# Patient Record
Sex: MALE | NOT HISPANIC OR LATINO | Employment: FULL TIME | URBAN - METROPOLITAN AREA
[De-identification: names, ages, dates, MRNs, and addresses within clinical notes are randomized per-mention and may not be internally consistent; named-entity substitution may affect disease eponyms.]

---
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
## 2020-03-10 ENCOUNTER — HEALTH MAINTENANCE LETTER (OUTPATIENT)
Age: 20
End: 2020-03-10

## 2020-12-14 ENCOUNTER — HOSPITAL ENCOUNTER (EMERGENCY)
Facility: CLINIC | Age: 20
Discharge: HOME | End: 2020-12-14
Attending: EMERGENCY MEDICINE | Admitting: EMERGENCY MEDICINE
Payer: COMMERCIAL

## 2020-12-14 VITALS
DIASTOLIC BLOOD PRESSURE: 66 MMHG | SYSTOLIC BLOOD PRESSURE: 119 MMHG | TEMPERATURE: 98.3 F | OXYGEN SATURATION: 98 % | HEART RATE: 81 BPM | RESPIRATION RATE: 10 BRPM

## 2020-12-14 DIAGNOSIS — T40.1X1A ACCIDENTAL OVERDOSE OF HEROIN, INITIAL ENCOUNTER (H): ICD-10-CM

## 2020-12-14 PROCEDURE — 96361 HYDRATE IV INFUSION ADD-ON: CPT

## 2020-12-14 PROCEDURE — 250N000011 HC RX IP 250 OP 636: Performed by: EMERGENCY MEDICINE

## 2020-12-14 PROCEDURE — 96374 THER/PROPH/DIAG INJ IV PUSH: CPT

## 2020-12-14 PROCEDURE — 99285 EMERGENCY DEPT VISIT HI MDM: CPT | Mod: 25

## 2020-12-14 PROCEDURE — 250N000013 HC RX MED GY IP 250 OP 250 PS 637: Performed by: EMERGENCY MEDICINE

## 2020-12-14 PROCEDURE — 258N000003 HC RX IP 258 OP 636: Performed by: EMERGENCY MEDICINE

## 2020-12-14 RX ORDER — ONDANSETRON 2 MG/ML
4 INJECTION INTRAMUSCULAR; INTRAVENOUS EVERY 30 MIN PRN
Status: DISCONTINUED | OUTPATIENT
Start: 2020-12-14 | End: 2020-12-14 | Stop reason: HOSPADM

## 2020-12-14 RX ORDER — ACETAMINOPHEN 325 MG/1
975 TABLET ORAL ONCE
Status: COMPLETED | OUTPATIENT
Start: 2020-12-14 | End: 2020-12-14

## 2020-12-14 RX ADMIN — ONDANSETRON 4 MG: 2 INJECTION INTRAMUSCULAR; INTRAVENOUS at 06:00

## 2020-12-14 RX ADMIN — ACETAMINOPHEN 975 MG: 325 TABLET, FILM COATED ORAL at 06:02

## 2020-12-14 RX ADMIN — SODIUM CHLORIDE 1000 ML: 9 INJECTION, SOLUTION INTRAVENOUS at 06:02

## 2020-12-14 ASSESSMENT — ENCOUNTER SYMPTOMS
NAUSEA: 1
HEADACHES: 1
CHILLS: 1
VOMITING: 0
SHORTNESS OF BREATH: 0

## 2020-12-14 NOTE — ED PROVIDER NOTES
History     Chief Complaint:  Drug Overdose      HPI   Mati Costa is a 20 year old male who presents via EMS for evaluation after a drug overdose.  The patient reports snorting a total of a half gram of heroin over several hours between 2100 and 0300.  His girlfriend gave him 3 doses of nasal narcan, 4 mg each.  By the time EMS arrived, he was alert and oriented.  He had been clean from heroin for 9 months before relapsing.  He complains of a headache, nausea, and feeling cold.  He denies any shortness of breath.      Allergies:  No known drug allergies.     Medications:    Zyprexa    Past Medical History:    Psychotic disorder   Substance abuse   Anxiety     Past Surgical History:    Pressure equalizing tubes     Family History:    History reviewed. No pertinent family history.     Social History:  Presents to the ED alone.  Drug use: Heroin.   PCP: Viry Jiang      Review of Systems   Constitutional: Positive for chills.   Respiratory: Negative for shortness of breath.    Gastrointestinal: Positive for nausea. Negative for vomiting.   Neurological: Positive for headaches.   All other systems reviewed and are negative.    Physical Exam   First Vitals:  BP: 122/82  Pulse: 70  Temp: 98.3  F (36.8  C)  Resp: 23  SpO2: 100 %      Physical Exam  General: Patient is alert, awake and interactive when I enter the room  Head: The scalp, face, and head appear normal  Eyes: Conjunctivae are normal  ENT: The nose is normal, Pinnae are normal, External acoustic canals are normal  Neck: Trachea midline  CV: Pulses are normal.   Resp: No respiratory distress   Musc: Normal muscular tone, moving all extremities.  Skin: No rash or lesions noted  Neuro: Speech is normal and fluent. Face is symmetric.   Psych: Flat affect, denies suicidal or homicidal ideations      Emergency Department Course     Interventions:   (0600) Zofran, 4 mg, IV injection   (0602) Normal Saline, 1 liter, IV bolus   (0602) Tylenol, 975 mg, PO      Emergency Department Course:  The patient arrived in the emergency department via [facility].   Nursing notes and vitals reviewed.  I performed an exam of the patient as documented above.     A peripheral IV was established.  Blood was drawn and sent for laboratory testing, results as above. The patient was placed on continuous cardiac monitoring and pulse oximetry.      I signed out the patient to the oncoming physician, Dr. Denny Dumont, pending full period of monitoring following Narcan administration.     Impression & Plan      Medical Decision Making:  Patient is a 20-year-old gentleman with past medical history of polysubstance abuse who presents emergency department today after an accidental heroin overdose.  Patient reports he snorted half a gram of heroin over the evening.  At which point patient allegedly had minimal respirations and was given multiple doses of intranasal Narcan by his girlfriend at the scene.  By the time EMS arrived patient was awake and alert.  There is no evidence of significant respiratory compromise.  On my initial evaluation he is mildly somnolent but has no respiratory depression.  He denies any coingestions.  Further denies any suicidal homicidal ideations.  Patient will require period of observation prior to his discharge to ensure he does not require any repeat doses of Narcan.    Diagnosis:    ICD-10-CM    1. Accidental overdose of heroin, initial encounter (H)  T40.1X1A        Disposition:  Signed out to oncoming provider at shift change.      I, Mari Han, am serving as a scribe on 12/14/2020 at 5:58 AM to personally document services performed by Xavier Leo MD based on my observations and the provider's statements to me.     Mari Han  12/14/2020   [facility] EMERGENCY DEPT       Xavier Leo MD  12/14/20 0654

## 2020-12-14 NOTE — ED NOTES
Bed: ED17  Expected date:   Expected time:   Means of arrival:   Comments:  Leonarda 1 20m heroin overdose A7O after nasal narcan eta 15

## 2020-12-14 NOTE — ED TRIAGE NOTES
Sober x9 months.  1/2 gram heroin between 2100, 0000, and 0300.  Overdosed on the 3rd dose and girlfriend gave him 12mg narcan.  A/O x4 on EMS arrival.  18G right AC IV inserted.

## 2020-12-14 NOTE — ED AVS SNAPSHOT
[facility] Emergency Dept  [address] [address]  Phone: [phone]  Fax: [phone]                                    Mati Costa   MRN: [id number]    Department: [facility] Emergency Dept   Date of Visit: 12/14/2020           After Visit Summary Signature Page    I have received my discharge instructions, and my questions have been answered. I have discussed any challenges I see with this plan with the nurse or doctor.    ..........................................................................................................................................  Patient/Patient Representative Signature      ..........................................................................................................................................  Patient Representative Print Name and Relationship to Patient    ..................................................               ................................................  Date                                   Time    ..........................................................................................................................................  Reviewed by Signature/Title    ...................................................              ..............................................  Date                                               Time          22EPIC Rev 08/18

## 2020-12-14 NOTE — ED PROVIDER NOTES
Sign Out Note    Pt accepted in sign out from: Dr. Yony Boateng pt presented to the ED for: Heroin overdose.    Plan at time of sign out: Allow the patient to await here for a total of 4 hours from last Narcan use and discharge if no repeat Narcan needed given patient's desire to be discharged.    Care of patient during my shift: No issues.  Patient strongly desired to leave after being here for 3 hours and 35 minutes.  This, along with the transport time to get here after the Narcan used as total 4 hours.    I had a face-to-face with the patient.  He has no medical concerns he wants addressed.  He feels safe going home.  I encourage close follow-up with the primary care physician and encouraged sobriety as well.  He agreed. He was given a prescription for Narcan.  His girlfriend is here to pick him up.    Plan for patient at this time: Discharged     Jaswinder Dumont, DO  12/14/20 0909

## 2020-12-27 ENCOUNTER — HEALTH MAINTENANCE LETTER (OUTPATIENT)
Age: 20
End: 2020-12-27

## 2021-04-02 ENCOUNTER — HOSPITAL ENCOUNTER (INPATIENT)
Facility: CLINIC | Age: 21
LOS: 2 days | Discharge: HOME | End: 2021-04-05
Attending: EMERGENCY MEDICINE | Admitting: PSYCHIATRY & NEUROLOGY
Payer: COMMERCIAL

## 2021-04-02 DIAGNOSIS — F10.10 ALCOHOL ABUSE: ICD-10-CM

## 2021-04-02 DIAGNOSIS — Z11.52 ENCOUNTER FOR SCREENING LABORATORY TESTING FOR SEVERE ACUTE RESPIRATORY SYNDROME CORONAVIRUS 2 (SARS-COV-2): ICD-10-CM

## 2021-04-02 DIAGNOSIS — F11.10 OPIOID ABUSE (H): ICD-10-CM

## 2021-04-02 LAB
ALBUMIN SERPL-MCNC: 3.7 G/DL (ref 3.4–5)
ALBUMIN UR-MCNC: NEGATIVE MG/DL
ALCOHOL BREATH TEST: 0.03 (ref 0–0.01)
ALP SERPL-CCNC: 71 U/L (ref 40–150)
ALT SERPL W P-5'-P-CCNC: 22 U/L (ref 0–70)
AMPHETAMINES UR QL SCN: NEGATIVE
ANION GAP SERPL CALCULATED.3IONS-SCNC: 8 MMOL/L (ref 3–14)
APPEARANCE UR: CLEAR
AST SERPL W P-5'-P-CCNC: 10 U/L (ref 0–45)
BACTERIA #/AREA URNS HPF: ABNORMAL /HPF
BARBITURATES UR QL: NEGATIVE
BASOPHILS # BLD AUTO: 0 10E9/L (ref 0–0.2)
BASOPHILS NFR BLD AUTO: 0.3 %
BENZODIAZ UR QL: NEGATIVE
BILIRUB SERPL-MCNC: 0.4 MG/DL (ref 0.2–1.3)
BILIRUB UR QL STRIP: NEGATIVE
BUN SERPL-MCNC: 15 MG/DL (ref 7–30)
CALCIUM SERPL-MCNC: 8.8 MG/DL (ref 8.5–10.1)
CANNABINOIDS UR QL SCN: NEGATIVE
CHLORIDE SERPL-SCNC: 104 MMOL/L (ref 94–109)
CO2 SERPL-SCNC: 27 MMOL/L (ref 20–32)
COCAINE UR QL: NEGATIVE
COLOR UR AUTO: ABNORMAL
CREAT SERPL-MCNC: 0.79 MG/DL (ref 0.66–1.25)
DIFFERENTIAL METHOD BLD: ABNORMAL
EOSINOPHIL # BLD AUTO: 0.1 10E9/L (ref 0–0.7)
EOSINOPHIL NFR BLD AUTO: 1 %
ERYTHROCYTE [DISTWIDTH] IN BLOOD BY AUTOMATED COUNT: 11.8 % (ref 10–15)
ETHANOL SERPL-MCNC: 0.04 G/DL
ETHANOL UR QL SCN: NEGATIVE
GFR SERPL CREATININE-BSD FRML MDRD: >90 ML/MIN/{1.73_M2}
GLUCOSE SERPL-MCNC: 116 MG/DL (ref 70–99)
GLUCOSE UR STRIP-MCNC: NEGATIVE MG/DL
HCT VFR BLD AUTO: 34.9 % (ref 40–53)
HGB BLD-MCNC: 12.2 G/DL (ref 13.3–17.7)
HGB UR QL STRIP: NEGATIVE
IMM GRANULOCYTES # BLD: 0 10E9/L (ref 0–0.4)
IMM GRANULOCYTES NFR BLD: 0.2 %
KETONES UR STRIP-MCNC: NEGATIVE MG/DL
LABORATORY COMMENT REPORT: NORMAL
LEUKOCYTE ESTERASE UR QL STRIP: NEGATIVE
LYMPHOCYTES # BLD AUTO: 2 10E9/L (ref 0.8–5.3)
LYMPHOCYTES NFR BLD AUTO: 32.4 %
MCH RBC QN AUTO: 29.6 PG (ref 26.5–33)
MCHC RBC AUTO-ENTMCNC: 35 G/DL (ref 31.5–36.5)
MCV RBC AUTO: 85 FL (ref 78–100)
MONOCYTES # BLD AUTO: 0.6 10E9/L (ref 0–1.3)
MONOCYTES NFR BLD AUTO: 9.3 %
NEUTROPHILS # BLD AUTO: 3.6 10E9/L (ref 1.6–8.3)
NEUTROPHILS NFR BLD AUTO: 56.8 %
NITRATE UR QL: NEGATIVE
NRBC # BLD AUTO: 0 10*3/UL
NRBC BLD AUTO-RTO: 0 /100
OPIATES UR QL SCN: NEGATIVE
PH UR STRIP: 5.5 PH (ref 5–7)
PLATELET # BLD AUTO: 203 10E9/L (ref 150–450)
POTASSIUM SERPL-SCNC: 3.8 MMOL/L (ref 3.4–5.3)
PROT SERPL-MCNC: 6.6 G/DL (ref 6.8–8.8)
RBC # BLD AUTO: 4.12 10E12/L (ref 4.4–5.9)
RBC #/AREA URNS AUTO: <1 /HPF (ref 0–2)
SARS-COV-2 RNA RESP QL NAA+PROBE: NEGATIVE
SODIUM SERPL-SCNC: 139 MMOL/L (ref 133–144)
SOURCE: ABNORMAL
SP GR UR STRIP: 1 (ref 1–1.03)
SPECIMEN SOURCE: NORMAL
SQUAMOUS #/AREA URNS AUTO: 0 /HPF (ref 0–1)
UROBILINOGEN UR STRIP-MCNC: NORMAL MG/DL (ref 0–2)
WBC # BLD AUTO: 6.3 10E9/L (ref 4–11)
WBC #/AREA URNS AUTO: 0 /HPF (ref 0–5)

## 2021-04-02 PROCEDURE — 81001 URINALYSIS AUTO W/SCOPE: CPT | Performed by: EMERGENCY MEDICINE

## 2021-04-02 PROCEDURE — 80307 DRUG TEST PRSMV CHEM ANLYZR: CPT | Performed by: EMERGENCY MEDICINE

## 2021-04-02 PROCEDURE — 99285 EMERGENCY DEPT VISIT HI MDM: CPT | Performed by: EMERGENCY MEDICINE

## 2021-04-02 PROCEDURE — 85025 COMPLETE CBC W/AUTO DIFF WBC: CPT | Performed by: EMERGENCY MEDICINE

## 2021-04-02 PROCEDURE — 87635 SARS-COV-2 COVID-19 AMP PRB: CPT | Performed by: EMERGENCY MEDICINE

## 2021-04-02 PROCEDURE — 80320 DRUG SCREEN QUANTALCOHOLS: CPT | Performed by: EMERGENCY MEDICINE

## 2021-04-02 PROCEDURE — 82075 ASSAY OF BREATH ETHANOL: CPT | Performed by: EMERGENCY MEDICINE

## 2021-04-02 PROCEDURE — 82077 ASSAY SPEC XCP UR&BREATH IA: CPT | Performed by: EMERGENCY MEDICINE

## 2021-04-02 PROCEDURE — 99284 EMERGENCY DEPT VISIT MOD MDM: CPT | Performed by: EMERGENCY MEDICINE

## 2021-04-02 PROCEDURE — HZ2ZZZZ DETOXIFICATION SERVICES FOR SUBSTANCE ABUSE TREATMENT: ICD-10-PCS | Performed by: EMERGENCY MEDICINE

## 2021-04-02 PROCEDURE — 87086 URINE CULTURE/COLONY COUNT: CPT | Performed by: EMERGENCY MEDICINE

## 2021-04-02 PROCEDURE — C9803 HOPD COVID-19 SPEC COLLECT: HCPCS | Performed by: EMERGENCY MEDICINE

## 2021-04-02 PROCEDURE — 80053 COMPREHEN METABOLIC PANEL: CPT | Performed by: EMERGENCY MEDICINE

## 2021-04-02 ASSESSMENT — ENCOUNTER SYMPTOMS
NAUSEA: 0
COUGH: 0
SORE THROAT: 0
TREMORS: 0
FEVER: 0
SHORTNESS OF BREATH: 0
VOMITING: 0
ABDOMINAL PAIN: 0
RHINORRHEA: 0
COLOR CHANGE: 0
SEIZURES: 0

## 2021-04-02 NOTE — ED PROVIDER NOTES
ED Provider Note  [facility]      History     Chief Complaint   Patient presents with     Drug / Alcohol Assessment     The history is provided by the patient.     Mati Costa is a 21 year old male with a medical history significant for anxiety who presents to the Emergency Department seeking detox from alcohol and heroin.    Patient reports that he drinks approximately a pint of hard liquor every couple of days.  Patient states that he does not drink daily.  He states that his last drink was just prior to arrival.  Patient reports that when he stops drinking he is unsure whether he has alcohol withdrawal symptoms as he also has heroin withdrawal symptoms that he feels masks any symptoms he may have from the alcohol withdrawal.  Patient does note that he gets a little shaky when he stops drinking.  He denies any history of alcohol withdrawal seizures or DTs.  Patient states that he has been using alcohol for a few years, his most recent period of using has been for the last 3 months.    Patient reports that he also uses IV heroin.  Patient reports that he uses approximately 0.5 g of IV heroin a day.  He reports that his last use was approximately 3 hours prior to arrival.  Patient states that his heroin withdrawal symptoms include restless legs, hot/cold flashes, difficulty sleeping and loss of appetite.  The patient denies any withdrawal symptoms currently here in the Emergency Department.  Patient states that his longest period of sobriety from heroin was approximately 8 months.  Patient has been using heroin currently for the last 4 months.  Patient reports that he has been on a Suboxone program in the past.    Patient denies any suicidal or homicidal ideation.  He denies any other substance use.  Patient denies any recent abdominal pain, nausea, vomiting, fevers, cough, congestion, runny nose, sore throat, chest pain or shortness of breath.    Past Medical History  No past  medical history on file.  No past surgical history on file.  No current outpatient medications on file.    No Known Allergies  Family History  No family history on file.  Social History   Social History     Tobacco Use     Smoking status: Not on file   Substance Use Topics     Alcohol use: Not on file     Drug use: Not on file      Past medical history, past surgical history, medications, allergies, family history, and social history were reviewed with the patient. No additional pertinent items.       Review of Systems   Constitutional: Negative for fever.   HENT: Negative for congestion, rhinorrhea and sore throat.    Respiratory: Negative for cough and shortness of breath.    Cardiovascular: Negative for chest pain.   Gastrointestinal: Negative for abdominal pain, nausea and vomiting.   Skin: Negative for color change.   Neurological: Negative for tremors and seizures.   Psychiatric/Behavioral: Negative for suicidal ideas.   All other systems reviewed and are negative.      Physical Exam   BP: 112/68  Pulse: 78  Temp: 98  F (36.7  C)  Resp: 16  SpO2: 100 %  Physical Exam  General: awake, alert, NAD  Head: normal cephalic  HEENT: pupils equal, conjugate gaze in tact  Neck: Supple  CV: regular rate and rhythm without murmur  Lungs: clear to ascultation  Abd: soft, non-tender, no guarding, no peritoneal signs  EXT: lower extremities without swelling or edema  Neuro: awake, answers questions appropriately. No focal deficits noted       ED Course      Procedures     6:05 PM  The patient was seen and examined by Jj Martin MD in Room ED09.           The medical record was reviewed and interpreted.  Current labs reviewed and interpreted.       Results for orders placed or performed during the hospital encounter of 04/02/21   Drug abuse screen 6 urine (tox)     Status: None   Result Value Ref Range    Amphetamine Qual Urine Negative NEG^Negative    Barbiturates Qual Urine Negative NEG^Negative    Benzodiazepine Qual  Urine Negative NEG^Negative    Cannabinoids Qual Urine Negative NEG^Negative    Cocaine Qual Urine Negative NEG^Negative    Ethanol Qual Urine Negative NEG^Negative    Opiates Qualitative Urine Negative NEG^Negative   Comprehensive metabolic panel     Status: Abnormal   Result Value Ref Range    Sodium 139 133 - 144 mmol/L    Potassium 3.8 3.4 - 5.3 mmol/L    Chloride 104 94 - 109 mmol/L    Carbon Dioxide 27 20 - 32 mmol/L    Anion Gap 8 3 - 14 mmol/L    Glucose 116 (H) 70 - 99 mg/dL    Urea Nitrogen 15 7 - 30 mg/dL    Creatinine 0.79 0.66 - 1.25 mg/dL    GFR Estimate >90 >60 mL/min/[1.73_m2]    GFR Estimate If Black >90 >60 mL/min/[1.73_m2]    Calcium 8.8 8.5 - 10.1 mg/dL    Bilirubin Total 0.4 0.2 - 1.3 mg/dL    Albumin 3.7 3.4 - 5.0 g/dL    Protein Total 6.6 (L) 6.8 - 8.8 g/dL    Alkaline Phosphatase 71 40 - 150 U/L    ALT 22 0 - 70 U/L    AST 10 0 - 45 U/L   CBC with platelets differential     Status: Abnormal   Result Value Ref Range    WBC 6.3 4.0 - 11.0 10e9/L    RBC Count 4.12 (L) 4.4 - 5.9 10e12/L    Hemoglobin 12.2 (L) 13.3 - 17.7 g/dL    Hematocrit 34.9 (L) 40.0 - 53.0 %    MCV 85 78 - 100 fl    MCH 29.6 26.5 - 33.0 pg    MCHC 35.0 31.5 - 36.5 g/dL    RDW 11.8 10.0 - 15.0 %    Platelet Count 203 150 - 450 10e9/L    Diff Method Automated Method     % Neutrophils 56.8 %    % Lymphocytes 32.4 %    % Monocytes 9.3 %    % Eosinophils 1.0 %    % Basophils 0.3 %    % Immature Granulocytes 0.2 %    Nucleated RBCs 0 0 /100    Absolute Neutrophil 3.6 1.6 - 8.3 10e9/L    Absolute Lymphocytes 2.0 0.8 - 5.3 10e9/L    Absolute Monocytes 0.6 0.0 - 1.3 10e9/L    Absolute Eosinophils 0.1 0.0 - 0.7 10e9/L    Absolute Basophils 0.0 0.0 - 0.2 10e9/L    Abs Immature Granulocytes 0.0 0 - 0.4 10e9/L    Absolute Nucleated RBC 0.0    UA with Microscopic     Status: Abnormal   Result Value Ref Range    Color Urine Straw     Appearance Urine Clear     Glucose Urine Negative NEG^Negative mg/dL    Bilirubin Urine Negative  NEG^Negative    Ketones Urine Negative NEG^Negative mg/dL    Specific Gravity Urine 1.004 1.003 - 1.035    Blood Urine Negative NEG^Negative    pH Urine 5.5 5.0 - 7.0 pH    Protein Albumin Urine Negative NEG^Negative mg/dL    Urobilinogen mg/dL Normal 0.0 - 2.0 mg/dL    Nitrite Urine Negative NEG^Negative    Leukocyte Esterase Urine Negative NEG^Negative    Source Unspecified Urine     WBC Urine 0 0 - 5 /HPF    RBC Urine <1 0 - 2 /HPF    Bacteria Urine None (A) NEG^Negative /HPF    Squamous Epithelial /HPF Urine 0 0 - 1 /HPF   Alcohol ethyl     Status: Abnormal   Result Value Ref Range    Ethanol g/dL 0.04 (H) <0.01 g/dL   Asymptomatic SARS-CoV-2 COVID-19 Virus (Coronavirus) by PCR     Status: None    Specimen: Nasopharyngeal   Result Value Ref Range    SARS-CoV-2 Virus Specimen Source Nasopharyngeal     SARS-CoV-2 PCR Result NEGATIVE     SARS-CoV-2 PCR Comment (Note)    Alcohol breath test POCT     Status: Abnormal   Result Value Ref Range    Alcohol Breath Test 0.029 (A) 0.00 - 0.01     Medications - No data to display     Assessments & Plan (with Medical Decision Making)   Mati is a 21-year-old male who presents requesting a detox bed for both alcohol and heroin.  Patient endorses using just prior to arrival and has no withdrawal symptoms at this time.    Will obtain basic labs for detox requirements.    Physical exam patient is well-appearing, nontoxic, normal physical exam without signs or symptoms of infection.    Detox bed was requested and placed on hold.  Will continue to monitor patient for signs and symptoms of withdrawal and treat as appropriate.    Plan is for patient to be admitted to detox once a bed is available.  Labs were unremarkable.    I have reviewed the nursing notes. I have reviewed the findings, diagnosis, plan and need for follow up with the patient.    New Prescriptions    No medications on file       Final diagnoses:   Alcohol abuse   Opioid abuse (H)       --  I, Shun Flood am  serving as a trained medical scribe to document services personally performed by Jj Martin MD, based on the provider's statements to me.     I, Jj Martin MD, was physically present and have reviewed and verified the accuracy of this note documented by Shun Flood.    Jj Martin MD  [facility] EMERGENCY DEPARTMENT  4/2/2021     Jj Martin MD  04/02/21 [id number]

## 2021-04-02 NOTE — ED TRIAGE NOTES
Pt states he is here for detox and has been using alcohol and heroin and use both earlier today. Pt states no hx of seizures coming off alcohol.

## 2021-04-03 PROBLEM — F10.10 ALCOHOL ABUSE: Status: ACTIVE | Noted: 2021-04-03

## 2021-04-03 PROBLEM — F11.10 OPIOID ABUSE (H): Status: ACTIVE | Noted: 2021-04-03

## 2021-04-03 LAB
BACTERIA SPEC CULT: NO GROWTH
SPECIMEN SOURCE: NORMAL

## 2021-04-03 PROCEDURE — 128N000004 HC R&B CD ADULT

## 2021-04-03 PROCEDURE — 99221 1ST HOSP IP/OBS SF/LOW 40: CPT | Performed by: PHYSICIAN ASSISTANT

## 2021-04-03 PROCEDURE — 99207 PR CONSULT E&M CHANGED TO INITIAL LEVEL: CPT | Performed by: PHYSICIAN ASSISTANT

## 2021-04-03 PROCEDURE — 250N000013 HC RX MED GY IP 250 OP 250 PS 637: Performed by: PSYCHIATRY & NEUROLOGY

## 2021-04-03 PROCEDURE — H2035 A/D TX PROGRAM, PER HOUR: HCPCS | Mod: HQ

## 2021-04-03 PROCEDURE — 99223 1ST HOSP IP/OBS HIGH 75: CPT | Mod: AI | Performed by: PSYCHIATRY & NEUROLOGY

## 2021-04-03 RX ORDER — TRAZODONE HYDROCHLORIDE 50 MG/1
50 TABLET, FILM COATED ORAL
Status: DISCONTINUED | OUTPATIENT
Start: 2021-04-03 | End: 2021-04-05 | Stop reason: HOSPADM

## 2021-04-03 RX ORDER — METHADONE HYDROCHLORIDE 5 MG/5ML
10 SOLUTION ORAL ONCE
Status: COMPLETED | OUTPATIENT
Start: 2021-04-05 | End: 2021-04-05

## 2021-04-03 RX ORDER — ATENOLOL 50 MG/1
50 TABLET ORAL DAILY PRN
Status: DISCONTINUED | OUTPATIENT
Start: 2021-04-03 | End: 2021-04-05 | Stop reason: HOSPADM

## 2021-04-03 RX ORDER — METHADONE HYDROCHLORIDE 5 MG/5ML
20 SOLUTION ORAL ONCE
Status: COMPLETED | OUTPATIENT
Start: 2021-04-04 | End: 2021-04-04

## 2021-04-03 RX ORDER — MULTIPLE VITAMINS W/ MINERALS TAB 9MG-400MCG
1 TAB ORAL DAILY
Status: DISCONTINUED | OUTPATIENT
Start: 2021-04-03 | End: 2021-04-05 | Stop reason: HOSPADM

## 2021-04-03 RX ORDER — FOLIC ACID 1 MG/1
1 TABLET ORAL DAILY
Status: DISCONTINUED | OUTPATIENT
Start: 2021-04-03 | End: 2021-04-05 | Stop reason: HOSPADM

## 2021-04-03 RX ORDER — LANOLIN ALCOHOL/MO/W.PET/CERES
100 CREAM (GRAM) TOPICAL DAILY
Status: DISCONTINUED | OUTPATIENT
Start: 2021-04-03 | End: 2021-04-05 | Stop reason: HOSPADM

## 2021-04-03 RX ORDER — DIAZEPAM 5 MG
5-20 TABLET ORAL EVERY 30 MIN PRN
Status: DISCONTINUED | OUTPATIENT
Start: 2021-04-03 | End: 2021-04-05 | Stop reason: HOSPADM

## 2021-04-03 RX ORDER — MAGNESIUM HYDROXIDE/ALUMINUM HYDROXICE/SIMETHICONE 120; 1200; 1200 MG/30ML; MG/30ML; MG/30ML
30 SUSPENSION ORAL EVERY 4 HOURS PRN
Status: DISCONTINUED | OUTPATIENT
Start: 2021-04-03 | End: 2021-04-05 | Stop reason: HOSPADM

## 2021-04-03 RX ORDER — ACETAMINOPHEN 325 MG/1
650 TABLET ORAL EVERY 4 HOURS PRN
Status: DISCONTINUED | OUTPATIENT
Start: 2021-04-03 | End: 2021-04-05 | Stop reason: HOSPADM

## 2021-04-03 RX ORDER — AMOXICILLIN 250 MG
1 CAPSULE ORAL 2 TIMES DAILY PRN
Status: DISCONTINUED | OUTPATIENT
Start: 2021-04-03 | End: 2021-04-05 | Stop reason: HOSPADM

## 2021-04-03 RX ORDER — CITALOPRAM HYDROBROMIDE 20 MG/1
20 TABLET ORAL DAILY
Status: DISCONTINUED | OUTPATIENT
Start: 2021-04-03 | End: 2021-04-05 | Stop reason: HOSPADM

## 2021-04-03 RX ORDER — METHADONE HYDROCHLORIDE 5 MG/5ML
30 SOLUTION ORAL ONCE
Status: COMPLETED | OUTPATIENT
Start: 2021-04-03 | End: 2021-04-03

## 2021-04-03 RX ORDER — NICOTINE 21 MG/24HR
1 PATCH, TRANSDERMAL 24 HOURS TRANSDERMAL DAILY
Status: DISCONTINUED | OUTPATIENT
Start: 2021-04-03 | End: 2021-04-05 | Stop reason: HOSPADM

## 2021-04-03 RX ORDER — HYDROXYZINE HYDROCHLORIDE 25 MG/1
25 TABLET, FILM COATED ORAL EVERY 4 HOURS PRN
Status: DISCONTINUED | OUTPATIENT
Start: 2021-04-03 | End: 2021-04-05 | Stop reason: HOSPADM

## 2021-04-03 RX ORDER — CITALOPRAM HYDROBROMIDE 20 MG/1
20 TABLET ORAL EVERY MORNING
COMMUNITY
End: 2021-06-30

## 2021-04-03 RX ORDER — TRAZODONE HYDROCHLORIDE 100 MG/1
100 TABLET ORAL AT BEDTIME
COMMUNITY
End: 2021-06-07

## 2021-04-03 RX ORDER — ONDANSETRON 4 MG/1
4 TABLET, ORALLY DISINTEGRATING ORAL EVERY 6 HOURS PRN
Status: DISCONTINUED | OUTPATIENT
Start: 2021-04-03 | End: 2021-04-05 | Stop reason: HOSPADM

## 2021-04-03 RX ADMIN — METHADONE HYDROCHLORIDE 30 MG: 5 SOLUTION ORAL at 14:46

## 2021-04-03 RX ADMIN — DIAZEPAM 5 MG: 5 TABLET ORAL at 16:24

## 2021-04-03 RX ADMIN — TRAZODONE HYDROCHLORIDE 50 MG: 50 TABLET ORAL at 22:13

## 2021-04-03 RX ADMIN — CITALOPRAM HYDROBROMIDE 20 MG: 20 TABLET ORAL at 07:57

## 2021-04-03 RX ADMIN — NICOTINE 1 PATCH: 21 PATCH, EXTENDED RELEASE TRANSDERMAL at 09:13

## 2021-04-03 RX ADMIN — HYDROXYZINE HYDROCHLORIDE 25 MG: 25 TABLET, FILM COATED ORAL at 07:56

## 2021-04-03 RX ADMIN — TRAZODONE HYDROCHLORIDE 50 MG: 50 TABLET ORAL at 22:19

## 2021-04-03 SDOH — HEALTH STABILITY: MENTAL HEALTH: HOW OFTEN DO YOU HAVE A DRINK CONTAINING ALCOHOL?: NOT ASKED

## 2021-04-03 SDOH — HEALTH STABILITY: MENTAL HEALTH: HOW OFTEN DO YOU HAVE 6 OR MORE DRINKS ON ONE OCCASION?: NOT ASKED

## 2021-04-03 SDOH — HEALTH STABILITY: MENTAL HEALTH: HOW MANY STANDARD DRINKS CONTAINING ALCOHOL DO YOU HAVE ON A TYPICAL DAY?: NOT ASKED

## 2021-04-03 ASSESSMENT — ACTIVITIES OF DAILY LIVING (ADL)
DIFFICULTY_COMMUNICATING: NO
DRESS: INDEPENDENT
DOING_ERRANDS_INDEPENDENTLY_DIFFICULTY: NO
LAUNDRY: WITH SUPERVISION
WALKING_OR_CLIMBING_STAIRS_DIFFICULTY: NO
TOILETING_ISSUES: NO
CONCENTRATING,_REMEMBERING_OR_MAKING_DECISIONS_DIFFICULTY: NO
ORAL_HYGIENE: INDEPENDENT
LAUNDRY: WITH SUPERVISION
HEARING_DIFFICULTY_OR_DEAF: NO
DIFFICULTY_EATING/SWALLOWING: NO
DRESSING/BATHING_DIFFICULTY: NO
FALL_HISTORY_WITHIN_LAST_SIX_MONTHS: NO
HYGIENE/GROOMING: INDEPENDENT
ORAL_HYGIENE: INDEPENDENT
DRESS: INDEPENDENT
WEAR_GLASSES_OR_BLIND: NO
HYGIENE/GROOMING: INDEPENDENT

## 2021-04-03 ASSESSMENT — MIFFLIN-ST. JEOR: SCORE: 1632.68

## 2021-04-03 NOTE — PHARMACY-ADMISSION MEDICATION HISTORY
Admission medication history interview status for the 4/2/2021 admission is complete. See Epic admission navigator for allergy information, pharmacy, prior to admission medications and immunization status.     Medication history interview sources: Patient    Changes made to PTA medication list (reason)  Added: none  Deleted: none  Changed: Trazodone: Added directions --> Take 100 mg by mouth at bedtime (per patient)    Additional medication history information (including reliability of information, actions taken by pharmacist):  -The patient reports no additional prescription or over-the-counter medications.  -Preferred pharmacy: [facility] in [place], [place]      Prior to Admission medications    Medication Sig Last Dose Taking? Auth Provider   citalopram (CELEXA) 20 MG tablet Take 20 mg by mouth every morning  4/2/2021 at AM Yes Reported, Patient   traZODone (DESYREL) 100 MG tablet Take 100 mg by mouth At Bedtime 4/1/2021 at PM Yes Unknown, Entered By History         Medication history completed by: Colleen Juan PD4

## 2021-04-03 NOTE — ED NOTES
Pt understands that 3A is a locked unit and that he will not be able to use his cell phone. He has been walking independently.

## 2021-04-03 NOTE — H&P
"[facility], [place]   Psychiatric History and Physical  Admission date: 4/2/2021      CHIEF COMPLAINT:  \"Heroin withdrawal and alcohol\"    HPI:  First detox admission to this facily fro this 22 yo male.   ABT 0.029. UTOX is negative.  Per ED:  Mati Costa is a 21 year old male with a medical history significant for anxiety who presents to the Emergency Department seeking detox from alcohol and heroin.     Patient reports that he drinks approximately a pint of hard liquor every couple of days.  Patient states that he does not drink daily.  He states that his last drink was just prior to arrival.  Patient reports that when he stops drinking he is unsure whether he has alcohol withdrawal symptoms as he also has heroin withdrawal symptoms that he feels masks any symptoms he may have from the alcohol withdrawal.  Patient does note that he gets a little shaky when he stops drinking.  He denies any history of alcohol withdrawal seizures or DTs.  Patient states that he has been using alcohol for a few years, his most recent period of using has been for the last 3 months.     Patient reports that he also uses IV heroin.  Patient reports that he uses approximately 0.5 g of IV heroin a day.  He reports that his last use was approximately 3 hours prior to arrival.  Patient states that his heroin withdrawal symptoms include restless legs, hot/cold flashes, difficulty sleeping and loss of appetite.  The patient denies any withdrawal symptoms currently here in the Emergency Department.  Patient states that his longest period of sobriety from heroin was approximately 8 months.  Patient has been using heroin currently for the last 4 months.  Patient reports that he has been on a Suboxone program in the past.     Patient denies any suicidal or homicidal ideation.  He denies any other substance use.  Patient denies any recent abdominal pain, nausea, vomiting, fevers, cough, congestion, runny nose, " sore throat, chest pain or shortness of breath.    States he is here for managing withdrawal from heroin and alcohol.  Wants to do a 3 day methadone taper.   was kicked out of sober living last year after 8 months of sobriety, went to live on his own, started to use again with his g/f since December. Now wants to detox and do outpatient treatment. [facility].  Denies psychosis/manjula.    PAST PSYCHIATRIC HISTORY:    No psychiatric history. No history of suicide attempts.  Currently takes Celexa for anxiety and Trazodone or sleep.    SUBSTANCE USE HISTORY:    Started using heroin at 19 yo, got it from friends.  usually uses anywhere between 0.5  - 1 g of heroin a day IV. Was treated at [facility] and at the [facility] 2 or 3 times.  Had 2 other detoxes. Does not use alcohol regularly, only when he comes down from heroin. States it does not help much.   has been drinking on and off for a few months.  Had a DUI at 17 and another one at 18.    PAST MEDICAL HISTORY:    PAST MEDICAL HISTORY:   Past Medical History:   Diagnosis Date     Alcohol abuse      Anxiety      Insomnia      Opiate dependence (H)        PAST SURGICAL HISTORY: none    FAMILY HISTORY:  Substance use on the father's side of family      SOCIAL HISTORY:  From [place], has 1 brother. Completed HS.  Has not done a lot since. Working at a [employer].     Please see the full psychosocial profile from the clinical treatment coordinator.   SOCIAL HISTORY:   Social History     Tobacco Use     Smoking status: Current Every Day Smoker     Packs/day: 1.00   Substance Use Topics     Alcohol use: Yes       PHYSICAL ROS:  The remainder of 10-point review of systems was negative except as noted in HPI.    PTA MEDICATIONS:  Medications Prior to Admission   Medication Sig Dispense Refill Last Dose     citalopram (CELEXA) 20 MG tablet Take 20 mg by mouth daily        TRAZODONE HCL PO              Allergies:  No Known Allergies       Labs:  Recent Results (from the  past 48 hour(s))   Drug abuse screen 6 urine (tox)    Collection Time: 04/02/21  6:17 PM   Result Value Ref Range    Amphetamine Qual Urine Negative NEG^Negative    Barbiturates Qual Urine Negative NEG^Negative    Benzodiazepine Qual Urine Negative NEG^Negative    Cannabinoids Qual Urine Negative NEG^Negative    Cocaine Qual Urine Negative NEG^Negative    Ethanol Qual Urine Negative NEG^Negative    Opiates Qualitative Urine Negative NEG^Negative   UA with Microscopic    Collection Time: 04/02/21  6:17 PM   Result Value Ref Range    Color Urine Straw     Appearance Urine Clear     Glucose Urine Negative NEG^Negative mg/dL    Bilirubin Urine Negative NEG^Negative    Ketones Urine Negative NEG^Negative mg/dL    Specific Gravity Urine 1.004 1.003 - 1.035    Blood Urine Negative NEG^Negative    pH Urine 5.5 5.0 - 7.0 pH    Protein Albumin Urine Negative NEG^Negative mg/dL    Urobilinogen mg/dL Normal 0.0 - 2.0 mg/dL    Nitrite Urine Negative NEG^Negative    Leukocyte Esterase Urine Negative NEG^Negative    Source Unspecified Urine     WBC Urine 0 0 - 5 /HPF    RBC Urine <1 0 - 2 /HPF    Bacteria Urine None (A) NEG^Negative /HPF    Squamous Epithelial /HPF Urine 0 0 - 1 /HPF   Urine Culture    Collection Time: 04/02/21  6:17 PM    Specimen: Urine Midstream; Unspecified Urine   Result Value Ref Range    Specimen Description Unspecified Urine     Culture Micro Culture negative monitoring continues    Alcohol breath test POCT    Collection Time: 04/02/21  6:21 PM   Result Value Ref Range    Alcohol Breath Test 0.029 (A) 0.00 - 0.01   Comprehensive metabolic panel    Collection Time: 04/02/21  6:49 PM   Result Value Ref Range    Sodium 139 133 - 144 mmol/L    Potassium 3.8 3.4 - 5.3 mmol/L    Chloride 104 94 - 109 mmol/L    Carbon Dioxide 27 20 - 32 mmol/L    Anion Gap 8 3 - 14 mmol/L    Glucose 116 (H) 70 - 99 mg/dL    Urea Nitrogen 15 7 - 30 mg/dL    Creatinine 0.79 0.66 - 1.25 mg/dL    GFR Estimate >90 >60 mL/min/[1.73_m2]     GFR Estimate If Black >90 >60 mL/min/[1.73_m2]    Calcium 8.8 8.5 - 10.1 mg/dL    Bilirubin Total 0.4 0.2 - 1.3 mg/dL    Albumin 3.7 3.4 - 5.0 g/dL    Protein Total 6.6 (L) 6.8 - 8.8 g/dL    Alkaline Phosphatase 71 40 - 150 U/L    ALT 22 0 - 70 U/L    AST 10 0 - 45 U/L   CBC with platelets differential    Collection Time: 04/02/21  6:49 PM   Result Value Ref Range    WBC 6.3 4.0 - 11.0 10e9/L    RBC Count 4.12 (L) 4.4 - 5.9 10e12/L    Hemoglobin 12.2 (L) 13.3 - 17.7 g/dL    Hematocrit 34.9 (L) 40.0 - 53.0 %    MCV 85 78 - 100 fl    MCH 29.6 26.5 - 33.0 pg    MCHC 35.0 31.5 - 36.5 g/dL    RDW 11.8 10.0 - 15.0 %    Platelet Count 203 150 - 450 10e9/L    Diff Method Automated Method     % Neutrophils 56.8 %    % Lymphocytes 32.4 %    % Monocytes 9.3 %    % Eosinophils 1.0 %    % Basophils 0.3 %    % Immature Granulocytes 0.2 %    Nucleated RBCs 0 0 /100    Absolute Neutrophil 3.6 1.6 - 8.3 10e9/L    Absolute Lymphocytes 2.0 0.8 - 5.3 10e9/L    Absolute Monocytes 0.6 0.0 - 1.3 10e9/L    Absolute Eosinophils 0.1 0.0 - 0.7 10e9/L    Absolute Basophils 0.0 0.0 - 0.2 10e9/L    Abs Immature Granulocytes 0.0 0 - 0.4 10e9/L    Absolute Nucleated RBC 0.0    Alcohol ethyl    Collection Time: 04/02/21  6:49 PM   Result Value Ref Range    Ethanol g/dL 0.04 (H) <0.01 g/dL   Asymptomatic SARS-CoV-2 COVID-19 Virus (Coronavirus) by PCR    Collection Time: 04/02/21  6:50 PM    Specimen: Nasopharyngeal   Result Value Ref Range    SARS-CoV-2 Virus Specimen Source Nasopharyngeal     SARS-CoV-2 PCR Result NEGATIVE     SARS-CoV-2 PCR Comment (Note)           Physical and Psychiatric Examination:    /77 (BP Location: Left arm)   Pulse 78   Temp 98.6  F (37  C) (Temporal)   Resp 16   Ht 1.829 m (6')   Wt 59 kg (130 lb)   SpO2 100%   BMI 17.63 kg/m    Weight is 130 lbs 0 oz  Body mass index is 17.63 kg/m .    PHYSICAL EXAM:  I have reviewed the physical exam as documented by by the medical team and agree with findings and  assessment and have no additional findings to add at this time.    MENTAL STATUS EXAM:  Appearance: dressed casually  Attitude:  Cooperative  Eye Contact:  fair  Mood:  dysphhoric  Affect:  congruent  Speech:  clear, coherent  Language: fluent and intact in English  Psychomotor behavior: no psychomotor agitation or retardation  Gait and Station:  normal  Thought Process:  linear  Associations:  no loose associations  Thought Content:  no evidence of suicidal ideation or homicidal ideation and no evidence of psychotic thought  Insight:  fair  Judgement:  fair  Oriented to:  time, person, and place  Attention Span and Concentration:  poor due to active withdrawal  Recent and Remote Memory:  intact  Fund of Knowledge:  consistent with education and life experiences    ADMISSION DIAGNOSES:  Opiate use disorder  Opiate withdrawal  Alcohol use disorder  Anxiety NOS    ASSESSMENT/PLAN:    22 yo with IV heroin and alcohol abuse. Negative UTOX but apparently in withdrawal.    1. Admit for detox  2. MSSA and COWS protocol, Methadone taper  3. Continue Celexa and Trazodone for sleep  4. Interested in referral to outpatient treatment  5. Anticipate 3 - 5 days      Reason for ongoing admission: requires detoxification from substance that poses a risk of bodily harm during withdrawal period  Discharge location: home with self-care  Discharge Medications: not ordered  Follow-up Appointments: not scheduled  Legal Status: voluntary    Entered by: Prem Marquez MD on 4/3/2021 at 2:02 PM

## 2021-04-03 NOTE — PLAN OF CARE
"Problem: Adult Inpatient Plan of Care  Goal: Plan of Care Review  Recent Flowsheet Documentation  Taken 4/3/2021 1031 by Shira Benitez, RN  Plan of Care Reviewed With: patient     Problem: Behavioral Health Plan of Care  Goal: Plan of Care Review  Recent Flowsheet Documentation  Taken 4/3/2021 1031 by Shira Benitez, RN  Plan of Care Reviewed With: patient  Patient Agreement with Plan of Care: agrees     Problem: Substance Withdrawal  Goal: Substance Withdrawal  Description: Signs and symptoms of listed problems will be absent or manageable.  Outcome: Declining    Patient is up and visible in the milieu. Patient is ambulating balanced and steady. Patient is alert and oriented x 4. Patient is not attending/participating in unit programming. Pt is minimally social with peers. Affect is blunted/flat, mood is tense/irritable. Patient denies SI/SIB/HI. Pt denies auditory/visual hallucinations. Patient verbally contracts for safety on the unit.     This RN administered the PRN medications hydroxyzine 25mg x 1, (see MAR) at the request of the patient for anxiety. Patient is tolerating medications well, denies any current side effects.     Pt's MSSA = 6, 6 and COWS = 12, 10. Patient is visibly restless and uncomfortable in the lounge. Patient reports a poor appetite, and poor sleep. Patient requests to do a methadone taper instead of buprenorphine. Patient reports buprenorphine \"makes him feel sick.\" Patient states he would like to do outpatient treatment when detox is completed. Rest, fluids, and food encouraged. Status 15 checks remain. Patient denies any unmet needs at this time.     Blood pressure 122/84, pulse 77, temperature 97.9  F (36.6  C), temperature source Temporal, resp. rate 16, height 1.829 m (6'), weight 59 kg (130 lb), SpO2 100 %.   "

## 2021-04-03 NOTE — CONSULTS
"[facility]  Consult Note - Hospitalist Service     Date of Admission: 4/2/2021  Consult Requested by: Dr. Marquez  Reason for Consult: Alcohol and Opiate Detox    Assessment & Plan   Mati Costa is a 21 year old male with a history of alcohol abuse, opiate dependence, anxiety, and insomnia admitted to  for opiate and alcohol withdrawal.    #Polysubstance Abuse and Withdrawal - Including opiates and alcohol.  - HIV, Hepatitis C, and Hep B ordered given hx of IVDU.  - Management per Psychiatry.    #Anxiety, Insomnia - On Celexa and Trazodone PTA.  - Management per Psychiatry.    Other Issues:  Urine Culture obtained in the ED and is pending. Unclear reason for obtaining as UA negative and no urinary symptoms, so would not treat if positive unless patient becomes symptomatic.    Medicine will follow up on results of HIV and Hepatitis B/C.    DINO Garza  [facility]  Contact information available via [facility] Paging/Directory  _____________________________________________________________________    Chief Complaint   Alcohol and Opiate Detox    History is obtained from the patient    History of Present Illness   Mati Costa is a 21 year old male with a history of alcohol abuse, opiate dependence, anxiety, and insomnia admitted to  for opiate and alcohol withdrawal. Prior to admission, he was using 0.5 to 1 g of IV heroin daily. He is unable to quantify the amount of alcohol he was consuming PTA, however, reports it was \"not a whole lot.\" Per ED documentation, he was drinking a pint of hard liquor every couple of days, but was not drinking daily. Denies any history of withdrawal seizures, DTs, abscesses, endocarditis, HIV, or Hepatitis B/C. Current withdrawal symptoms include restlessness, decreased appetite, and body aches.    Review of Systems   The 10 point Review of Systems is negative other than noted in the HPI or " here.     Past Medical History    I have reviewed this patient's medical history and updated it with pertinent information if needed.   Past Medical History:   Diagnosis Date     Alcohol abuse      Anxiety      Insomnia      Opiate dependence (H)        Past Surgical History   Reviewed. Patient denies any prior surgeries.    Social History   I have reviewed this patient's social history and updated it with pertinent information if needed.  Social History     Tobacco Use     Smoking status: Current Every Day Smoker     Packs/day: 1.00   Substance Use Topics     Alcohol use: Yes     Drug use: Yes     Types: Opiates     Comment: 0.5-1 g of IV heroin daily       Family History   Reviewed. Patient denies any family history of CAD, CVA, DM, or malignancy.    Medications   Medications Prior to Admission   Medication Sig Dispense Refill Last Dose     citalopram (CELEXA) 20 MG tablet Take 20 mg by mouth daily        TRAZODONE HCL PO           Allergies   No Known Allergies    Physical Exam   Vital Signs: Temp: 97.9  F (36.6  C) Temp src: Temporal BP: 122/84 Pulse: 77   Resp: 16 SpO2: 100 % O2 Device: None (Room air)    Weight: 130 lbs 0 oz    General: Lying in bed. Appears comfortable. NAD.  HEENT: Anicteric sclera. MMM.  CV: RRR. No appreciable murmurs.  Respiratory: Normal effort on RA. Lungs CTAB.  GI: Abdomen is soft, non-tender, and non-distended. Bowel sounds present.  Extremities: No peripheral edema. Warm and well perfused.  Neuro: A&O x 3. Moves all extremities.  Skin: Injection sites c/d/i without evidence of infection. No rashes or jaundice.    Data   Results for orders placed or performed during the hospital encounter of 04/02/21 (from the past 24 hour(s))   Drug abuse screen 6 urine (tox)   Result Value Ref Range    Amphetamine Qual Urine Negative NEG^Negative    Barbiturates Qual Urine Negative NEG^Negative    Benzodiazepine Qual Urine Negative NEG^Negative    Cannabinoids Qual Urine Negative NEG^Negative     Cocaine Qual Urine Negative NEG^Negative    Ethanol Qual Urine Negative NEG^Negative    Opiates Qualitative Urine Negative NEG^Negative   UA with Microscopic   Result Value Ref Range    Color Urine Straw     Appearance Urine Clear     Glucose Urine Negative NEG^Negative mg/dL    Bilirubin Urine Negative NEG^Negative    Ketones Urine Negative NEG^Negative mg/dL    Specific Gravity Urine 1.004 1.003 - 1.035    Blood Urine Negative NEG^Negative    pH Urine 5.5 5.0 - 7.0 pH    Protein Albumin Urine Negative NEG^Negative mg/dL    Urobilinogen mg/dL Normal 0.0 - 2.0 mg/dL    Nitrite Urine Negative NEG^Negative    Leukocyte Esterase Urine Negative NEG^Negative    Source Unspecified Urine     WBC Urine 0 0 - 5 /HPF    RBC Urine <1 0 - 2 /HPF    Bacteria Urine None (A) NEG^Negative /HPF    Squamous Epithelial /HPF Urine 0 0 - 1 /HPF   Alcohol breath test POCT   Result Value Ref Range    Alcohol Breath Test 0.029 (A) 0.00 - 0.01   Comprehensive metabolic panel   Result Value Ref Range    Sodium 139 133 - 144 mmol/L    Potassium 3.8 3.4 - 5.3 mmol/L    Chloride 104 94 - 109 mmol/L    Carbon Dioxide 27 20 - 32 mmol/L    Anion Gap 8 3 - 14 mmol/L    Glucose 116 (H) 70 - 99 mg/dL    Urea Nitrogen 15 7 - 30 mg/dL    Creatinine 0.79 0.66 - 1.25 mg/dL    GFR Estimate >90 >60 mL/min/[1.73_m2]    GFR Estimate If Black >90 >60 mL/min/[1.73_m2]    Calcium 8.8 8.5 - 10.1 mg/dL    Bilirubin Total 0.4 0.2 - 1.3 mg/dL    Albumin 3.7 3.4 - 5.0 g/dL    Protein Total 6.6 (L) 6.8 - 8.8 g/dL    Alkaline Phosphatase 71 40 - 150 U/L    ALT 22 0 - 70 U/L    AST 10 0 - 45 U/L   CBC with platelets differential   Result Value Ref Range    WBC 6.3 4.0 - 11.0 10e9/L    RBC Count 4.12 (L) 4.4 - 5.9 10e12/L    Hemoglobin 12.2 (L) 13.3 - 17.7 g/dL    Hematocrit 34.9 (L) 40.0 - 53.0 %    MCV 85 78 - 100 fl    MCH 29.6 26.5 - 33.0 pg    MCHC 35.0 31.5 - 36.5 g/dL    RDW 11.8 10.0 - 15.0 %    Platelet Count 203 150 - 450 10e9/L    Diff Method Automated Method      % Neutrophils 56.8 %    % Lymphocytes 32.4 %    % Monocytes 9.3 %    % Eosinophils 1.0 %    % Basophils 0.3 %    % Immature Granulocytes 0.2 %    Nucleated RBCs 0 0 /100    Absolute Neutrophil 3.6 1.6 - 8.3 10e9/L    Absolute Lymphocytes 2.0 0.8 - 5.3 10e9/L    Absolute Monocytes 0.6 0.0 - 1.3 10e9/L    Absolute Eosinophils 0.1 0.0 - 0.7 10e9/L    Absolute Basophils 0.0 0.0 - 0.2 10e9/L    Abs Immature Granulocytes 0.0 0 - 0.4 10e9/L    Absolute Nucleated RBC 0.0    Alcohol ethyl   Result Value Ref Range    Ethanol g/dL 0.04 (H) <0.01 g/dL   Asymptomatic SARS-CoV-2 COVID-19 Virus (Coronavirus) by PCR    Specimen: Nasopharyngeal   Result Value Ref Range    SARS-CoV-2 Virus Specimen Source Nasopharyngeal     SARS-CoV-2 PCR Result NEGATIVE     SARS-CoV-2 PCR Comment (Note)

## 2021-04-03 NOTE — PLAN OF CARE
Problem: Adult Inpatient Plan of Care  Goal: Plan of Care Review  Outcome: No Change     S: Pt.is a 21 year old male admitted from [facility] adult ED for alcohol and heroin detox. Pt.signed in voluntarily.     B: This is pt's first inpatient detox admission to this facility. Pt.reports using ETOH every other day. Last used alcohol prior to the ED visit. Reported using about .5mg of heroin daily. Last used heroin at 1300 on Friday, 4/2/2021.  However, his UTOX result was only positive for ETOH and no heroin. He denied using any other drugs besides ETOH and heroin.     A: Pt.appeared restless and anxious during admission search and interview. He denied a history of withdrawal seizures or DTs. Denied SI/SIB/hallucinations. Started on MSSA with valium for alcohol withdrawal. Pt.does not appear to be in active alcohol or heroin withdrawal at this time. MSSA scores = 4 & 3. He will be seen by a psychiatric provider to ascertain his history of heroin use. Endorsed mental health history of anxiety. Denied any medical concerns.     R: He is covid-19 negative. Writer gave unit orientation. Staff started 15 minutes safety checks. Will continue to monitor.

## 2021-04-03 NOTE — ED NOTES
ED to Behavioral Floor Handoff    SITUATION  Mati Costa is a 21 year old male who speaks Data Unavailable and lives home status is unknown unknown The patient arrived in the ED by private car from emergency room with a complaint of Drug / Alcohol Assessment  .The patient's current symptoms started/worsened 1 month(s) ago and during this time the symptoms have increased.   In the ED, pt was diagnosed with   Final diagnoses:   Alcohol abuse   Opioid abuse (H)        Initial vitals were: BP: 112/68  Pulse: 78  Temp: 98  F (36.7  C)  Resp: 16  SpO2: 100 %   --------  Is the patient diabetic? No   If yes, last blood glucose? --     If yes, was this treated in the ED? --  --------  Is the patient inebriated (ETOH) No or Impaired on other substances? No  MSSA done? Yes  Last MSSA score: --    Were withdrawal symptoms treated? N/A  Does the patient have a seizure history? No. If yes, date of most recent seizure--  --------  Is the patient patient experiencing suicidal ideation? denies current or recent suicidal ideation     Homicidal ideation? denies current or recent homicidal ideation or behaviors.    Self-injurious behavior/urges? denies current or recent self injurious behavior or ideation.  ------  Was pt aggressive in the ED No  Was a code called No  Is the pt now cooperative? Yes  -------  Meds given in ED: Medications - No data to display   Family present during ED course? No  Family currently present? No    BACKGROUND  Does the patient have a cognitive impairment or developmental disability? No  Allergies: No Known Allergies.   Social demographics are   Social History     Socioeconomic History     Marital status: Not on file     Spouse name: Not on file     Number of children: Not on file     Years of education: Not on file     Highest education level: Not on file   Occupational History     Not on file   Social Needs     Financial resource strain: Not on file     Food insecurity     Worry: Not on file      Inability: Not on file     Transportation needs     Medical: Not on file     Non-medical: Not on file   Tobacco Use     Smoking status: Not on file   Substance and Sexual Activity     Alcohol use: Not on file     Drug use: Not on file     Sexual activity: Not on file   Lifestyle     Physical activity     Days per week: Not on file     Minutes per session: Not on file     Stress: Not on file   Relationships     Social connections     Talks on phone: Not on file     Gets together: Not on file     Attends [employer] service: Not on file     Active member of club or organization: Not on file     Attends meetings of clubs or organizations: Not on file     Relationship status: Not on file     Intimate partner violence     Fear of current or ex partner: Not on file     Emotionally abused: Not on file     Physically abused: Not on file     Forced sexual activity: Not on file   Other Topics Concern     Not on file   Social History Narrative     Not on file        ASSESSMENT  Labs results   Labs Ordered and Resulted from Time of ED Arrival Up to the Time of Departure from the ED   COMPREHENSIVE METABOLIC PANEL - Abnormal; Notable for the following components:       Result Value    Glucose 116 (*)     Protein Total 6.6 (*)     All other components within normal limits   CBC WITH PLATELETS DIFFERENTIAL - Abnormal; Notable for the following components:    RBC Count 4.12 (*)     Hemoglobin 12.2 (*)     Hematocrit 34.9 (*)     All other components within normal limits   ROUTINE UA WITH MICROSCOPIC - Abnormal; Notable for the following components:    Bacteria Urine None (*)     All other components within normal limits   ALCOHOL ETHYL - Abnormal; Notable for the following components:    Ethanol g/dL 0.04 (*)     All other components within normal limits   ALCOHOL BREATH TEST POCT - Abnormal; Notable for the following components:    Alcohol Breath Test 0.029 (*)     All other components within normal limits   DRUG ABUSE SCREEN 6 CHEM  DEP URINE ([facility])   SARS-COV-2 (COVID-19) VIRUS RT-PCR   URINE CULTURE AEROBIC BACTERIAL      Imaging Studies: No results found for this or any previous visit (from the past 24 hour(s)).   Most recent vital signs /81   Pulse 59   Temp 98.3  F (36.8  C) (Oral)   Resp 16   SpO2 98%    Abnormal labs/tests/findings requiring intervention:---   Pain control: good  Nausea control: good    RECOMMENDATION  Are any infection precautions needed (MRSA, VRE, etc.)? No If yes, what infection? --  ---  Does the patient have mobility issues? independently. If yes, what device does the pt use? ---  ---  Is patient on 72 hour hold or commitment? No If on 72 hour hold, have hold and rights been given to patient? N/A  Are admitting orders written if after 10 p.m. ?N/A  Tasks needing to be completed:---     Aleksey Almazan RN   [facility]--    [phone] [facility]   [phone] [facility]

## 2021-04-03 NOTE — PROGRESS NOTES
04/03/21 0136   Patient Belongings   Did you bring any home meds/supplements to the hospital?  Yes   Disposition of meds  Sent to security/pharmacy per site process;Returned to patient   Patient Belongings remains with patient;locker;sent to security per site process;returned to patient at discharge   Patient Belongings Remaining with Patient clothing;shoes   Patient Belongings Put in Hospital Secure Location (Security or Locker, etc.) other (see comments)   Belongings Search Yes   Clothing Search Yes   Second Staff [employer] RN   Comment clothing without strings & unlaced shoe remain with patient   STORAGE BIN: backpack, mask, personal hygiene, 2 sweatpant with strings, shorts with strings, hat, socks  MED BIN: wallet x2, phone  SECURITY: visa x4( [employer] x3, fidelity), [place], medication  A               Admission:  I am responsible for any personal items that are not sent to the safe or pharmacy.  [facility] is not responsible for loss, theft or damage of any property in my possession.    Signature:  _________________________________ Date: _______  Time: _____                                              Staff Signature:  ____________________________ Date: ________  Time: _____      2nd Staff person, if patient is unable/unwilling to sign:    Signature: ________________________________ Date: ________  Time: _____     Discharge:  [facility] has returned all of my personal belongings:    Signature: _________________________________ Date: ________  Time: _____                                          Staff Signature:  ____________________________ Date: ________  Time: _____

## 2021-04-04 LAB
CHOLEST SERPL-MCNC: 125 MG/DL
GGT SERPL-CCNC: 17 U/L (ref 0–75)
HBV SURFACE AB SERPL IA-ACNC: 0.84 M[IU]/ML
HBV SURFACE AG SERPL QL IA: NONREACTIVE
HCV AB SERPL QL IA: NONREACTIVE
HDLC SERPL-MCNC: 48 MG/DL
HIV 1+2 AB+HIV1 P24 AG SERPL QL IA: NONREACTIVE
LDLC SERPL CALC-MCNC: 59 MG/DL
NONHDLC SERPL-MCNC: 77 MG/DL
TRIGL SERPL-MCNC: 88 MG/DL
TSH SERPL DL<=0.005 MIU/L-ACNC: 0.6 MU/L (ref 0.4–4)
VIT B12 SERPL-MCNC: 381 PG/ML (ref 193–986)

## 2021-04-04 PROCEDURE — 128N000004 HC R&B CD ADULT

## 2021-04-04 PROCEDURE — H2032 ACTIVITY THERAPY, PER 15 MIN: HCPCS

## 2021-04-04 PROCEDURE — 250N000013 HC RX MED GY IP 250 OP 250 PS 637: Performed by: PSYCHIATRY & NEUROLOGY

## 2021-04-04 PROCEDURE — 87340 HEPATITIS B SURFACE AG IA: CPT | Performed by: PSYCHIATRY & NEUROLOGY

## 2021-04-04 PROCEDURE — 36415 COLL VENOUS BLD VENIPUNCTURE: CPT | Performed by: PSYCHIATRY & NEUROLOGY

## 2021-04-04 PROCEDURE — 82977 ASSAY OF GGT: CPT | Performed by: PSYCHIATRY & NEUROLOGY

## 2021-04-04 PROCEDURE — 80061 LIPID PANEL: CPT | Performed by: PSYCHIATRY & NEUROLOGY

## 2021-04-04 PROCEDURE — 82607 VITAMIN B-12: CPT | Performed by: PSYCHIATRY & NEUROLOGY

## 2021-04-04 PROCEDURE — 87389 HIV-1 AG W/HIV-1&-2 AB AG IA: CPT | Performed by: PSYCHIATRY & NEUROLOGY

## 2021-04-04 PROCEDURE — 86803 HEPATITIS C AB TEST: CPT | Performed by: PSYCHIATRY & NEUROLOGY

## 2021-04-04 PROCEDURE — 84443 ASSAY THYROID STIM HORMONE: CPT | Performed by: PSYCHIATRY & NEUROLOGY

## 2021-04-04 PROCEDURE — 86706 HEP B SURFACE ANTIBODY: CPT | Performed by: PSYCHIATRY & NEUROLOGY

## 2021-04-04 RX ADMIN — METHADONE HYDROCHLORIDE 20 MG: 5 SOLUTION ORAL at 13:15

## 2021-04-04 RX ADMIN — HYDROXYZINE HYDROCHLORIDE 25 MG: 25 TABLET, FILM COATED ORAL at 12:35

## 2021-04-04 RX ADMIN — MULTIPLE VITAMINS W/ MINERALS TAB 1 TABLET: TAB at 08:40

## 2021-04-04 RX ADMIN — CITALOPRAM HYDROBROMIDE 20 MG: 20 TABLET ORAL at 08:40

## 2021-04-04 RX ADMIN — Medication 100 MG: at 08:41

## 2021-04-04 RX ADMIN — NICOTINE 1 PATCH: 21 PATCH, EXTENDED RELEASE TRANSDERMAL at 08:41

## 2021-04-04 RX ADMIN — TRAZODONE HYDROCHLORIDE 50 MG: 50 TABLET ORAL at 21:39

## 2021-04-04 RX ADMIN — FOLIC ACID 1 MG: 1 TABLET ORAL at 08:41

## 2021-04-04 ASSESSMENT — ACTIVITIES OF DAILY LIVING (ADL)
DRESS: STREET CLOTHES;INDEPENDENT
ORAL_HYGIENE: INDEPENDENT
HYGIENE/GROOMING: HANDWASHING;INDEPENDENT

## 2021-04-04 NOTE — PROGRESS NOTES
Pt.slept throughout the night. MSSA score = 3. No safety or behavioral concerns. Will continue to monitor.

## 2021-04-04 NOTE — PLAN OF CARE
Problem: Substance Withdrawal  Goal: Substance Withdrawal  Description: Signs and symptoms of listed problems will be absent or manageable.  4/4/2021 1812 by Maya Ro RN  Outcome: Completed    S: Patient scored less than 8 for greater than 24 hours. He has required no medication for alcohol withdrawal for > 24 hours.    B: Patient admitted for alcohol withdrawal and detoxification. Also Heroin withdrawal on Methadone taper.   A: Patient stable in the alcohol withdrawal process MSSA scores < 8 for > 24 hours. Pt has one more dose of Methadone tomorrow. He was again encouraged to work on his CD assessment paperwork.    R: Patient removed from alcohol detox status per unit Protocol.

## 2021-04-04 NOTE — PROGRESS NOTES
04/03/21 2200   Group Therapy Session   Group Attendance   (Psychotherapy)   Number of patients attending the group:  11  Group Length:  1 Hours    Group Therapy Type:     Summary of Group / Topics Discussed:      The  Psychotherapy group goal is to promote insight to positive choice and change. Group processing is within a supportive and safe environment. Patients will process emotions using verbal group and expressive psychotherapy interventions including visual art/writing interventions.    Group interventions support patients by: hope/optimism    Modalities to reach these goals include: positive/solution focused psychology  and Expressive Arts Therapies    Subjective -patient report of mood today- regretful his family has been through hard times due to his use    Objective/ Intervention- Goal of group and Therapeutic modality utilized- process and creative expression    Group Response- very large group engaged    Patient Response- Pt was pleasant, cooperative quietly engaged.  Pt was somewhat withdrawn. He accepted an invite from writer as he was sitting in the opposite lounge by himself as all of his peers except one were in group. He was soft spoken and made a comment to the effect that he didn't care about himself but sorry to put his family through difficulties because of his use.    Seamus Young, RAVI, ATR-BC

## 2021-04-04 NOTE — PLAN OF CARE
Problem: Substance Withdrawal  Goal: Substance Withdrawal  Description: Signs and symptoms of listed problems will be absent or manageable.  Outcome: Improving     S: Pt admitted for alcohol withdrawal, MSSA 2 not requiring valium so far this shift. Pt was also using heroin and is on day 2 of a methadone taper.      A: Pt reports he slept well, appetite is adequate, denies anxiety or depression and  denies current thoughts plan or intent for self harm or harm towards others.  Affect is full range, visible on the unit and denies any specific needs or concerns.   R. He was encouraged to work on CD assessment paperwork.  He is unsure if he is interested in CD.  He was educated on social distancing, wearing a mask and handwashing.  Will continue to monitor and assist with discharge planning.

## 2021-04-05 VITALS
WEIGHT: 130 LBS | SYSTOLIC BLOOD PRESSURE: 112 MMHG | HEIGHT: 72 IN | RESPIRATION RATE: 16 BRPM | BODY MASS INDEX: 17.61 KG/M2 | DIASTOLIC BLOOD PRESSURE: 74 MMHG | OXYGEN SATURATION: 96 % | HEART RATE: 86 BPM | TEMPERATURE: 98.9 F

## 2021-04-05 PROCEDURE — 99239 HOSP IP/OBS DSCHRG MGMT >30: CPT | Performed by: PSYCHIATRY & NEUROLOGY

## 2021-04-05 PROCEDURE — 250N000013 HC RX MED GY IP 250 OP 250 PS 637: Performed by: PSYCHIATRY & NEUROLOGY

## 2021-04-05 RX ORDER — LANOLIN ALCOHOL/MO/W.PET/CERES
100 CREAM (GRAM) TOPICAL DAILY
Qty: 30 TABLET | Refills: 0 | Status: SHIPPED | OUTPATIENT
Start: 2021-04-06 | End: 2021-05-07

## 2021-04-05 RX ORDER — MULTIPLE VITAMINS W/ MINERALS TAB 9MG-400MCG
1 TAB ORAL DAILY
Qty: 30 TABLET | Refills: 0 | Status: SHIPPED | OUTPATIENT
Start: 2021-04-06 | End: 2021-05-07

## 2021-04-05 RX ADMIN — NICOTINE 1 PATCH: 21 PATCH, EXTENDED RELEASE TRANSDERMAL at 08:20

## 2021-04-05 RX ADMIN — METHADONE HYDROCHLORIDE 10 MG: 5 SOLUTION ORAL at 13:45

## 2021-04-05 RX ADMIN — FOLIC ACID 1 MG: 1 TABLET ORAL at 08:19

## 2021-04-05 RX ADMIN — Medication 100 MG: at 08:19

## 2021-04-05 RX ADMIN — CITALOPRAM HYDROBROMIDE 20 MG: 20 TABLET ORAL at 08:19

## 2021-04-05 RX ADMIN — MULTIPLE VITAMINS W/ MINERALS TAB 1 TABLET: TAB at 08:19

## 2021-04-05 ASSESSMENT — ACTIVITIES OF DAILY LIVING (ADL)
LAUNDRY: WITH SUPERVISION
HYGIENE/GROOMING: INDEPENDENT
DRESS: STREET CLOTHES;INDEPENDENT
ORAL_HYGIENE: INDEPENDENT

## 2021-04-05 NOTE — PROGRESS NOTES
Patient discharged to home at 1630 via ambulatory. Patient VSS and COWS score a 2. Patient denied any additional questions or concerns at this time. Patient belongings returned.

## 2021-04-05 NOTE — PLAN OF CARE
Behavioral Team Discussion: (4/5/2021)    Continued Stay Criteria/Rationale: Patient admitted for alcohol withdrawal, complicated.  Plan: The following services will be provided to the patient; psychiatric assessment, medication management, therapeutic milieu, individual and group support, and skills groups.   Participants: 3A Provider: Dr. Jovani Blakely MD; 3A RN's: Shira Awad, RN; 3A CM's: Elly Doll [facility]  Summary/Recommendation: Providers will assess today for treatment recommendations, discharge planning, and aftercare plans. CM will meet with pt for discharge planning.   Medical/Physical: Internal medicine consult completed 4/3/21.  Precautions:   Behavioral Orders   Procedures     Code 1 - Restrict to Unit     Routine Programming     As clinically indicated     Status 15     Every 15 minutes.     Withdrawal precautions     Rationale for change in precautions or plan: N/A  Progress: Improving.

## 2021-04-05 NOTE — PROGRESS NOTES
Patient will be discharged at 1630 to home.     This RN went over discharge instructions, teachings, patient's labs, and unit recommendations with patient.    This RN went over patient's prescribed medications being sent home with patient from the discharge pharmacy. Patient verbalizes and demonstrates understanding of all teachings. Patient verbalizes understanding of medication instructions and indications. Patient denies thoughts of harm towards self or others. Patient denies suicidal ideation, plan or intent. Patient denies auditory/visual hallucinations. Pt denies any current medication side effects or medical issues at this time.     Patient denies any further questions. Verbal report given to evening shift.

## 2021-04-05 NOTE — PROGRESS NOTES
Patient requesting discharge today after last dose of methadone. Dr. Blakely and case management notified.

## 2021-04-05 NOTE — PROGRESS NOTES
Brief Medicine Note    His HIV, HCV, and HBV screening labs are all negative. Per [facility] review it appears he completed his hepatitis B vaccination series. He could consider a booster vaccination with his primary care provider as he would be considered high risk if he continues to use IV substances.     Medicine will sign off. Please do not hesitate to contact if new questions or concerns arise.       Neisha Nguyễn PA-C  Hospitalist Service  Pager: [phone]

## 2021-04-05 NOTE — PROGRESS NOTES
Pt.slept through night shift. He appeared calm and comfortable. His breathing was unlabored. No safety or behavioral concerns. Will continue to monitor.

## 2021-04-05 NOTE — DISCHARGE INSTRUCTIONS
Behavioral Discharge Planning and Instructions  THANK YOU FOR CHOOSING [address]  [phone]    Summary: You were admitted to [facility] on 4/3/21 for detoxification from opiates and alcohol.  A medical exam was performed that included lab work. You have met with a  and opted to attend [facility].  Please take care and make your recovery a daily priority, Mati! It was a pleasure working with you and the entire treatment team here wishes you the very best in your recovery!     Recommendation:  Enter and complete IOP treatment at [facility] and follow all recommendations of your treatment providers.      Main Diagnoses:  Per Dr. Alma MD;  Opiate use disorder  Opiate withdrawal  Alcohol use disorder  Anxiety NOS    Major Treatments, Procedures and Findings:  You were treated for opiate detoxification using a 3-day methadone taper. You were treated for alcohol detoxification using the [facility] protocol. You have declined to have a chemical dependency assessment. You had labs drawn and those results were reviewed with you. Please take a copy of your lab work with you to your next primary care provider appointment.    Symptoms to Report:  If you experience more anxiety, confusion, sleeplessness, deep sadness or thoughts of suicide, notify your treatment team or notify your primary care provider. IF ANY OF THE SYMPTOMS YOU ARE EXPERIENCING ARE A MEDICAL EMERGENCY CALL 911 IMMEDIATELY.     Lifestyle Adjustment: Adjust your lifestyle to get enough sleep, relaxation, exercise and good nutrition. Continue to develop healthy coping skills to decrease stress and promote a sober living environment. Do not use mood altering substances including alcohol, illegal drugs or addictive medications other than what is currently prescribed.     Disposition: Home    Treatment Follow-Up:  [facility] location  Admit date/time: 4/6/21, verify start time  Address:  "[address], [address], [place] [address]  Phone: [phone]  Check with program on your start time, and whether this program is virtual or in person.   About the program: \"Our programs are designed for individuals experiencing personal impact and negative consequences of alcohol or drug use. Consequences often result in personal dissatisfaction, negative impact on relationships with family and friends, poor performance in school or on the job, and legal consequences. Additionally, people may suffer from specific emotional problems such as depression, anxiety, mood disorders, personality disorders, or other mental diagnoses. We understand the relationship between substance dependence and emotional and mental health issues and offer integrated treatment in order to address both dimensions.\"    Facts about COVID19 at www.cdc.gov/COVID19 and www.[place].gov/covid19    Keeping hands clean is one of the most important steps we can take to avoid getting sick and spreading germs to others.  Please wash your hands frequently and lather with soap for at least 20 seconds!    Follow-up Appointment:   Please make a follow-up appointment with Primary Care Provider within one week of discharge.  Bring a copy of your labs with you.     Recovery apps for your phone to locate current in person and zoom recovery meetings  [place] - meeting toney  AA  - meeting toney  Meeting guide - meeting toney  Quick NA meeting - meeting toney  Stoney- has various apps    Resources:  *due to covid-19 most AA/NA meetings are being held online*  AA meetings online search for them at: https://aa-intergroup.org (worldwide meeting listings)  AA meetings for [place] area can be found online at: https://a[place].org (click local online meetings listings)  NA meetings for [place] area can be found online at: https://www.nam[place].org  (click find a meeting)  AA and NA Sponsors are excellent resources for support and you can find one at " any support group meeting.   Alcoholics Anonymous (https://aa.org/): for information 24 hours/day  AA Intergroup service office in [place] (http://www.aastpaul.org/) 344.245.3067  AA Intergroup service office in [facility]: [phone]. (http://www.a[place].org/)  Narcotics Anonymous (www.Spear[employer]hinnesLumenergi.org) (708) 570-4914  https://aafairviewriverside.org/meetings  SMART Recovery - self management for addiction recovery:  www.smartrecKeystone Technologiesy.org  Pathways ~ A Health Crisis Resource & Support Center:  713.439.8328.  https://prescribetoprevent.org/patient-education/videos/  http://www.harmreduction.org  [facility] [place] 548-207-4335  Support Group:  AA/NA and Sponsor/support.  National [place] on Mental Illness (www.[place].tracy.org): 591.151.1291 or 274-466-6686.  Alcoholics Anonymous (www.alcoholics-anonymous.org): Check your phone book for your local chapter.  Suicide Awareness Voices of Education (SAVE) (www.save.org): 398-674-WOME (3618)  National Suicide Prevention Line (www.mentalhealthmn.org): 435-209-KMCF (1638)  Mental Health Consumer/Survivor Network of [place] (www.mhcsn.net): 557.140.4778 or 333-731-3921  Mental Health Association of [place] (www.mentalhealth.org): 364.534.8740 or 252-206-3010   Substance Abuse and Mental Health Services (www.samhsa.gov)  [place] Opioid Prevention Coalition: www.opioidcoalition.org    [place] Recovery Connection (MRC)  [facility] connects people seeking recovery to resources that help foster and sustain long-term recovery.  Whether you are seeking resources for treatment, transportation, housing, job training, education, health care or other pathways to recovery, [facility] is a great place to start.  440.677.4297.  www.[employer].org    Great Pod casts for nutrition and wellness  Listen on Apple Podcasts  [employer] Nutrition   [employer]   Nutrition       Understand the connection between what you eat and how you feel. Hosted by licensed  nutritionists and dietitians from [employer] we share practical, real-life solutions for healthier living through nutrition.     General Medication Instructions:   See your medication sheet(s) for instructions.   Take all medications as prescribed.  Make no changes unless your primary care provider suggests them.   Go to all your primary care provider visits.  Be sure to have all your required lab tests. This way, your medicines can be refilled on time.  Do not use any forms of alcohol.    Please Note:  If you have any questions at anytime after you are discharged please call [facility] detox unit 3[facility] at [phone].  [facility], Behavioral Intake [phone]  Medical Records call [phone]  Outpatient Behavioral Intake call [phone]  LP+ Wait List/Bed Availability call [phone]    Please remember to take all of your behavioral discharge planning and lab paperwork to any follow up appointments, it contains your lab results, diagnosis, medication list and discharge recommendations.      THANK YOU FOR CHOOSING [facility]

## 2021-04-05 NOTE — DISCHARGE SUMMARY
Mati Costa MRN# [id number]   Age: 21 year old YOB: 2000     Date of Admission:  4/2/2021  Date of Discharge:  4/5/2021  Admitting Physician:  Prem Marquez MD  Discharge Physician:  Jovani Blakely MD      DISCHARGE  DX  Alcohol dependence  Opiate use disorder    Alcohol use disorder severe  Anxiety NOS         Event Leading to Hospitalization:     See Admission note by admitting provider for patient encounter. for additional details.          Hospital Course:   PATIENT was admitted to [facility] attending  under DR blakely, please review the detailed admit note on 4/3/21by Alma,   The patient was placed under status 15 (15 minute checks) to ensure patient safety.   MSSA protocol was initiated due to the patient's history of alcohol abuse and concern for withdrawal symptoms.  CBC, BMP and utox obtained.    All outpatient medications were continued  Patient was detoxed off opiates using methadone 30/ 20/10 he does not want to be on Suboxone maintenance he does not want to be on methadone maintenance she is open to trying naltrexone injection.  PATIENTdid participate in groups and was visible in the milieu.     The patient's symptoms of alcohol withdrawal improved.     Patients energy motivation , sleep appetite improved.  Pt completed detox . It was un eventful.      Discussed with patient medications for craving.  Spoke with patient about triggers coping skills relapse prevention.    CONSULTS DONE DURING PATIENTS HOSPITALIZATION.  Patient was seen by medicine on date4/3/21    This as per their medical consult    Mati Costa is a 21 year old male with a history of alcohol abuse, opiate dependence, anxiety, and insomnia admitted to [facility] for opiate and alcohol withdrawal.     #Polysubstance Abuse and Withdrawal - Including opiates and alcohol.  - HIV, Hepatitis C, and Hep B ordered given hx of IVDU.  - Management per Psychiatry.     #Anxiety, Insomnia - On Celexa and Trazodone PTA.  -  "Management per Psychiatry.     Other Issues:  Urine Culture obtained in the ED and is pending. Unclear reason for obtaining as UA negative and no urinary symptoms, so would not treat if positive unless patient becomes symptomatic.           Pt was seen by cm  As per recommendations from cm    Met with pt to discuss discharge planning. Pt reports he is set up to start [facility] IOP program 4/6/21. Pt declines [facility]. Pt reports he lives with \"my girl,\" and reports this is a safe, sober, supportive environment. Pt reports he was in a sober house last year and has some sober friends who he can reach out to for more support. Declines any resources from case management at this time. AVS updated.          Labs:reviewed with patient       Recent Results (from the past 48 hour(s))   GGT    Collection Time: 04/04/21  6:42 AM   Result Value Ref Range    GGT 17 0 - 75 U/L   Lipid panel    Collection Time: 04/04/21  6:42 AM   Result Value Ref Range    Cholesterol 125 <200 mg/dL    Triglycerides 88 <150 mg/dL    HDL Cholesterol 48 >39 mg/dL    LDL Cholesterol Calculated 59 <100 mg/dL    Non HDL Cholesterol 77 <130 mg/dL   TSH with free T4 reflex and/or T3 as indicated    Collection Time: 04/04/21  6:42 AM   Result Value Ref Range    TSH 0.60 0.40 - 4.00 mU/L   Vitamin B12    Collection Time: 04/04/21  6:42 AM   Result Value Ref Range    Vitamin B12 381 193 - 986 pg/mL   HIV Antigen Antibody Combo    Collection Time: 04/04/21  6:42 AM   Result Value Ref Range    HIV Antigen Antibody Combo Nonreactive NR^Nonreactive       Hepatitis B surface antigen    Collection Time: 04/04/21  6:42 AM   Result Value Ref Range    Hep B Surface Agn Nonreactive NR^Nonreactive   Hepatitis B Surface Antibody    Collection Time: 04/04/21  6:42 AM   Result Value Ref Range    Hepatitis B Surface Antibody 0.84 <8.00 m[IU]/mL   Hepatitis C antibody    Collection Time: 04/04/21  6:42 " AM   Result Value Ref Range    Hepatitis C Antibody Nonreactive NR^Nonreactive         Recent Results (from the past 240 hour(s))   Drug abuse screen 6 urine (tox)    Collection Time: 04/02/21  6:17 PM   Result Value Ref Range    Amphetamine Qual Urine Negative NEG^Negative    Barbiturates Qual Urine Negative NEG^Negative    Benzodiazepine Qual Urine Negative NEG^Negative    Cannabinoids Qual Urine Negative NEG^Negative    Cocaine Qual Urine Negative NEG^Negative    Ethanol Qual Urine Negative NEG^Negative    Opiates Qualitative Urine Negative NEG^Negative   UA with Microscopic    Collection Time: 04/02/21  6:17 PM   Result Value Ref Range    Color Urine Straw     Appearance Urine Clear     Glucose Urine Negative NEG^Negative mg/dL    Bilirubin Urine Negative NEG^Negative    Ketones Urine Negative NEG^Negative mg/dL    Specific Gravity Urine 1.004 1.003 - 1.035    Blood Urine Negative NEG^Negative    pH Urine 5.5 5.0 - 7.0 pH    Protein Albumin Urine Negative NEG^Negative mg/dL    Urobilinogen mg/dL Normal 0.0 - 2.0 mg/dL    Nitrite Urine Negative NEG^Negative    Leukocyte Esterase Urine Negative NEG^Negative    Source Unspecified Urine     WBC Urine 0 0 - 5 /HPF    RBC Urine <1 0 - 2 /HPF    Bacteria Urine None (A) NEG^Negative /HPF    Squamous Epithelial /HPF Urine 0 0 - 1 /HPF   Urine Culture    Collection Time: 04/02/21  6:17 PM    Specimen: Urine Midstream; Unspecified Urine   Result Value Ref Range    Specimen Description Unspecified Urine     Culture Micro No growth    Alcohol breath test POCT    Collection Time: 04/02/21  6:21 PM   Result Value Ref Range    Alcohol Breath Test 0.029 (A) 0.00 - 0.01   Comprehensive metabolic panel    Collection Time: 04/02/21  6:49 PM   Result Value Ref Range    Sodium 139 133 - 144 mmol/L    Potassium 3.8 3.4 - 5.3 mmol/L    Chloride 104 94 - 109 mmol/L    Carbon Dioxide 27 20 - 32 mmol/L    Anion Gap 8 3 - 14 mmol/L    Glucose 116 (H) 70 - 99 mg/dL    Urea Nitrogen 15 7 -  30 mg/dL    Creatinine 0.79 0.66 - 1.25 mg/dL    GFR Estimate >90 >60 mL/min/[1.73_m2]    GFR Estimate If Black >90 >60 mL/min/[1.73_m2]    Calcium 8.8 8.5 - 10.1 mg/dL    Bilirubin Total 0.4 0.2 - 1.3 mg/dL    Albumin 3.7 3.4 - 5.0 g/dL    Protein Total 6.6 (L) 6.8 - 8.8 g/dL    Alkaline Phosphatase 71 40 - 150 U/L    ALT 22 0 - 70 U/L    AST 10 0 - 45 U/L   CBC with platelets differential    Collection Time: 04/02/21  6:49 PM   Result Value Ref Range    WBC 6.3 4.0 - 11.0 10e9/L    RBC Count 4.12 (L) 4.4 - 5.9 10e12/L    Hemoglobin 12.2 (L) 13.3 - 17.7 g/dL    Hematocrit 34.9 (L) 40.0 - 53.0 %    MCV 85 78 - 100 fl    MCH 29.6 26.5 - 33.0 pg    MCHC 35.0 31.5 - 36.5 g/dL    RDW 11.8 10.0 - 15.0 %    Platelet Count 203 150 - 450 10e9/L    Diff Method Automated Method     % Neutrophils 56.8 %    % Lymphocytes 32.4 %    % Monocytes 9.3 %    % Eosinophils 1.0 %    % Basophils 0.3 %    % Immature Granulocytes 0.2 %    Nucleated RBCs 0 0 /100    Absolute Neutrophil 3.6 1.6 - 8.3 10e9/L    Absolute Lymphocytes 2.0 0.8 - 5.3 10e9/L    Absolute Monocytes 0.6 0.0 - 1.3 10e9/L    Absolute Eosinophils 0.1 0.0 - 0.7 10e9/L    Absolute Basophils 0.0 0.0 - 0.2 10e9/L    Abs Immature Granulocytes 0.0 0 - 0.4 10e9/L    Absolute Nucleated RBC 0.0    Alcohol ethyl    Collection Time: 04/02/21  6:49 PM   Result Value Ref Range    Ethanol g/dL 0.04 (H) <0.01 g/dL   Asymptomatic SARS-CoV-2 COVID-19 Virus (Coronavirus) by PCR    Collection Time: 04/02/21  6:50 PM    Specimen: Nasopharyngeal   Result Value Ref Range    SARS-CoV-2 Virus Specimen Source Nasopharyngeal     SARS-CoV-2 PCR Result NEGATIVE     SARS-CoV-2 PCR Comment (Note)    GGT    Collection Time: 04/04/21  6:42 AM   Result Value Ref Range    GGT 17 0 - 75 U/L   Lipid panel    Collection Time: 04/04/21  6:42 AM   Result Value Ref Range    Cholesterol 125 <200 mg/dL    Triglycerides 88 <150 mg/dL    HDL Cholesterol 48 >39 mg/dL    LDL Cholesterol Calculated 59 <100 mg/dL     Non HDL Cholesterol 77 <130 mg/dL   TSH with free T4 reflex and/or T3 as indicated    Collection Time: 04/04/21  6:42 AM   Result Value Ref Range    TSH 0.60 0.40 - 4.00 mU/L   Vitamin B12    Collection Time: 04/04/21  6:42 AM   Result Value Ref Range    Vitamin B12 381 193 - 986 pg/mL   HIV Antigen Antibody Combo    Collection Time: 04/04/21  6:42 AM   Result Value Ref Range    HIV Antigen Antibody Combo Nonreactive NR^Nonreactive       Hepatitis B surface antigen    Collection Time: 04/04/21  6:42 AM   Result Value Ref Range    Hep B Surface Agn Nonreactive NR^Nonreactive   Hepatitis B Surface Antibody    Collection Time: 04/04/21  6:42 AM   Result Value Ref Range    Hepatitis B Surface Antibody 0.84 <8.00 m[IU]/mL   Hepatitis C antibody    Collection Time: 04/04/21  6:42 AM   Result Value Ref Range    Hepatitis C Antibody Nonreactive NR^Nonreactive            Because this patient meets criteria for an Alcohol Use Disorder, I performed the following brief intervention on the date of this note:              1) Expressed concern that the patient is drinking at unhealthy levels known to increase their risk of alcohol related problems              2) Gave feedback linking alcohol use and health, including personalized feedback explaining how alcohol use can interact with their medical and/or psychiatric problems, and with prescribed medications.              3) Advised patient to abstain.    PT counseled on nicotine cessation and nicotine replacement provided    Discussed with patient many issues of addiction,triggers, relapse, and establishing a solid recovery program.    DISCHARGE MENTAL STATUS EXAMINATION:  The patient is alert, oriented x3.  Good fund of knowledge.  Good use of language.  Recent and remote memory, language, fund of knowledge are all adequate.  Euthymic mood congruent affect  Speech normal rate/rhythm linear tp no loose asso,The patient does not have any active suicidal or homicidal ideation.  Does  not have any auditory or visual hallucination.  Fair insight/judgment At this time, the patient was stable to be discharged.        Pt was not determined to not be a danger to himself or others. At the current time of discharge, the patient does not meet criteria for involuntary hospitalization. On the day of discharge, the patient reports that they do not have suicidal or homicidal ideation and would never hurt themselves or others. Steps taken to minimize risk include: assessing patient s behavior and thought process daily during hospital stay, discharging patient with adequate plan for follow up for mental and physical health and discussing safety plan of returning to the hospital should the patient ever have thoughts of harming themselves or others. Therefore, based on all available evidence including the factors cited above, the patient does not appear to be at imminent risk for self-harm, and is appropriate for outpatient level of care.     Educated about side effects/risk vs benefits /alternative including non treatment.Pt consented to be on medication.     .Total time spent on discharge summary more than 35 min  More than  20 min  planning, coordination of care, medication reconciliation and performance of physical exam on day of discharge.Care was coordinated with unit RN and unit therapist     Mati Costa   Home Medication Instructions MARTHA:[id number]    Printed on:04/09/21 3088   Medication Information                      citalopram (CELEXA) 20 MG tablet  Take 20 mg by mouth every morning              multivitamin w/minerals (THERA-VIT-M) tablet  Take 1 tablet by mouth daily             thiamine (B-1) 100 MG tablet  Take 1 tablet (100 mg) by mouth daily             traZODone (DESYREL) 100 MG tablet  Take 100 mg by mouth At Bedtime                 Disposition:    Disposition: Home   f/u dr faulkner 4/14/21  Treatment Follow-Up:  [facility] location  Admit date/time:  "4/6/21, verify start time  Address: [address], [address], [place] [address]  Phone: [phone]  Check with program on your start time, and whether this program is virtual or in person.    Facts about COVID19 at www.cdc.gov/COVID19 and www.[place].gov/covid19     Keeping hands clean is one of the most important steps we can take to avoid getting sick and spreading germs to others.  Please wash your hands frequently and lather with soap for at least 20 seconds!     Medical Follow-Up: Patient has a follow-up with addiction clinic on the 14th ideally like for patient to be on some kind of maintenance program but he wants to try naltrexone because he had a bad reaction to Suboxone.    His prognosis guarded if he relapses he has Narcan at home       Treatment Follow-Up:  .        \"Much or all of the text in this note was generated through the use of Dragon Dictate voice to text software. Errors in spelling or words which appear to be out of contact are unintentional, may be present due having escaped editing\"     "

## 2021-04-09 ENCOUNTER — HOSPITAL ENCOUNTER (EMERGENCY)
Facility: CLINIC | Age: 21
Discharge: LEFT BEFORE DISCHARGE | End: 2021-04-09
Attending: EMERGENCY MEDICINE | Admitting: EMERGENCY MEDICINE
Payer: COMMERCIAL

## 2021-04-09 VITALS
DIASTOLIC BLOOD PRESSURE: 69 MMHG | SYSTOLIC BLOOD PRESSURE: 107 MMHG | OXYGEN SATURATION: 100 % | HEART RATE: 83 BPM | RESPIRATION RATE: 16 BRPM | TEMPERATURE: 98.6 F

## 2021-04-09 DIAGNOSIS — R45.851 VERBALIZES SUICIDAL THOUGHTS: ICD-10-CM

## 2021-04-09 LAB
AMPHETAMINES UR QL SCN: NEGATIVE
BARBITURATES UR QL: NEGATIVE
BENZODIAZ UR QL: POSITIVE
CANNABINOIDS UR QL SCN: NEGATIVE
COCAINE UR QL: POSITIVE
LABORATORY COMMENT REPORT: NORMAL
OPIATES UR QL SCN: NEGATIVE
PCP UR QL SCN: NEGATIVE
SARS-COV-2 RNA RESP QL NAA+PROBE: NEGATIVE
SPECIMEN SOURCE: NORMAL

## 2021-04-09 PROCEDURE — 87635 SARS-COV-2 COVID-19 AMP PRB: CPT | Performed by: EMERGENCY MEDICINE

## 2021-04-09 PROCEDURE — 99285 EMERGENCY DEPT VISIT HI MDM: CPT

## 2021-04-09 PROCEDURE — C9803 HOPD COVID-19 SPEC COLLECT: HCPCS

## 2021-04-09 PROCEDURE — 80307 DRUG TEST PRSMV CHEM ANLYZR: CPT | Performed by: EMERGENCY MEDICINE

## 2021-04-09 ASSESSMENT — ENCOUNTER SYMPTOMS
DYSPHORIC MOOD: 1
COUGH: 0
FEVER: 0
SORE THROAT: 0

## 2021-04-09 NOTE — ED NOTES
"Pt in room, standing in doorway stating, \"I don't want to wait anymore. I need to get out of here.\" Mother insisting that the patient needs to stay and be admitted for mental health treatment. MD notified and spoke to patient. MD states that the patient does not meet hold criteria at this time.  "

## 2021-04-09 NOTE — ED PROVIDER NOTES
Signout from previous team.  Patient was suicidal, possibly overdose 2 days ago, although he tells me that was not necessarily a suicidal overdose.  Shortly after signout, patient attempted to leave, I was able to speak with him near the front door of the emergency room, as he was walking out.  He did stop and talk to me, and told me emphatically he is not suicidal, and had no desire to hurt himself.  He tells me that he does not want to wait and go to [facility], and I do not believe that I am within my rights to hold him here against as well.  He was having a conversation with his mother at this time as well, and since he tells me he has a safe place to go, does not appear to be currently intoxicated, and denies suicidal ideation, we will of course allow him to leave.  He was encouraged to come back with any changes.     Nathaniel Best MD  04/09/21 [id number]

## 2021-04-09 NOTE — PROGRESS NOTES
Received call from mother about pt.  He was on [facility]a to detox from alcohol/opiates.  He was also on anti depressant celexa  ,he was discharged home after 3 day methadone taper.   He did not want to take suboxone /methadone maintaine.  He reported he was allergic to Subutex and he did not want to be on methadone maintenance.  On the day of discharge she was not suicidal he was not homicidal      When he went home he found out that his girlfriend was cheating on him he was spending a lot of money taking care of her.  He reports posts on Facebook and took 1 g of heroin he passed out.  He denies this was a suicide attempt but family thinks this is this is a suicide attempt.  He did make suicidal statements wishing he was not there posting on Facebook.    Mother called the unit to find out about how to get help.    Patient and I spoke with him he does not feel depressed he does not have any suicide ideation plan or intent he has no access to gun he describes it is exhausted.  Mother feels that patient is minimizing his symptoms.    Patient did not follow through with his outpatient treatment plan he still is taking his antidepressant.  He still has an appointment on the 14th naltrexone.    I have instructed mother and patient to go to the [facility] unit [facility].    I received a call saying mother and patient are on their way to the [facility]  unit    I spoke with Dr. sloan and gave him the collateral information

## 2021-04-09 NOTE — ED TRIAGE NOTES
Went to detox for heroin last Friday. While in detox found out girlfriend had cheated on him. 2 days ago pt took 1g of heroin in attempt to not live anymore. Currently does not feel suicidal.

## 2021-04-09 NOTE — ED PROVIDER NOTES
"  History   Chief Complaint:  Mental Health Problem      HPI   aMti Costa is a 21 year old male who presents with a mental health problem. The patient reports that he had been in detox for heroin use one week ago and was released 4 days ago on Monday, at which time he noted that it \"felt good to be clean\". However, he found out then that his girlfriend whom he lives with had cheated on him, causing him significant distress. Two days ago, he reports that took 1 g of heroin intravenously and states that he felt hopeless and did not expect he would live through it. However, he was found sleeping, did not require Narcan, and has had family staying with him since then. Today, the patient's mother called the detox unit and left a message for the patient's doctor; when she called back, she stated that they had wanted the patient to have outpatient treatment and that he needed to come to the emergency department or they would call the police. Patient denies cough, sore throat, fever, known COVID-19 exposure, and other co-ingestions. He states that he drinks alcohol \"every now and then\", has tried other drugs but currently only uses heroin, and uses an e-cigarette. He denies ever having attempted suicide.     Review of Systems   Constitutional: Negative for fever.   HENT: Negative for sore throat.    Respiratory: Negative for cough.    Psychiatric/Behavioral: Positive for dysphoric mood. Negative for self-injury.   All other systems reviewed and are negative.      Allergies:  The patient has no known allergies.    Medications:    Celexa  Trazodone    Past Medical History:    Alcohol abuse  Anxiety  Depression  Opiate dependence  Substance abuse  Psychotic disorder     Social History:  Patient presents in the ED alone.  Endorses occasional alcohol use.  Drug use: Yes, heroin  Smoking status: Current smoker, e-cigarette    Physical Exam     Patient Vitals for the past 24 hrs:   BP Temp Temp src Pulse Resp SpO2   04/09/21 " 1420 107/69 98.6  F (37  C) Temporal 83 16 100 %       Physical Exam  Nursing note and vitals reviewed.  Constitutional:  Appears well-developed and well-nourished.   HENT:   Head:    Atraumatic.   Mouth/Throat:   Oropharynx is clear and moist. No oropharyngeal exudate.   Eyes:    Pupils are equal, round, and reactive to light.   Neck:    Normal range of motion. Neck supple.      No tracheal deviation present. No thyromegaly present.   Cardiovascular:  Normal rate, regular rhythm, no murmur   Pulmonary/Chest: Breath sounds are clear and equal without wheezes or crackles.  Abdominal:   Soft. Bowel sounds are normal. Exhibits no distension and      no mass. There is no tenderness.      There is no rebound and no guarding.   Musculoskeletal:  Exhibits no edema.   Lymphadenopathy:  No cervical adenopathy.   Neurological:   Alert and oriented to person, place, and time.   Skin:    Skin is warm and dry. No rash noted. No pallor.      Tiny scabbed puncture wound to the left antecubital fossa without redness, warmth, or swelling.    Emergency Department Course   Laboratory:  Drug abuse screen urine: Pending    Asymptomatic COVID19 Virus PCR by nasopharyngeal swab pending     Emergency Department Course:    Reviewed:  I reviewed nursing notes, vitals, past history and care everywhere    Assessments:  1440 I obtained history and examined the patient as noted above.     1505 I rechecked the patient. He will be signed out to my colleague, Dr. Best, pending medical clearance for transfer to [facility].    Disposition:  Care of the patient was transferred to my colleague, Dr. Best, pending medical clearance for transfer to [facility].    Impression & Plan    Medical Decision Making:  The patient is having some intermittent suicidal ideations, although he states that he is not currently suicidal. I medically cleared him, he was tested for COVID-19 and a drug screen was ordered and the plan is that he will be discharged to  [facility] for mental health evaluation and [facility]. He was signed out to Dr. Edmundo Best.    Covid-19  Mati Costa was evaluated during a global COVID-19 pandemic, which necessitated consideration that the patient might be at risk for infection with the SARS-CoV-2 virus that causes COVID-19.   Applicable protocols for evaluation were followed during the patient's care.   COVID-19 was considered as part of the patient's evaluation. The plan for testing is:  a test was obtained during this visit.    Diagnosis:    ICD-10-CM    1. Verbalizes suicidal thoughts  R45.851 Drug abuse screen urine         Scribe Disclosure:  I, Soha Luong, am serving as a scribe at 2:34 PM on 4/9/2021 to document services personally performed by Cynthia Clarke MD based on my observations and the provider's statements to me.      Cynthia Clarke MD  04/09/21 9403

## 2021-04-14 ENCOUNTER — VIRTUAL VISIT (OUTPATIENT)
Dept: ADDICTION MEDICINE | Facility: CLINIC | Age: 21
End: 2021-04-14
Payer: COMMERCIAL

## 2021-04-14 DIAGNOSIS — F11.90 OPIOID USE DISORDER: Primary | ICD-10-CM

## 2021-04-14 PROCEDURE — 99204 OFFICE O/P NEW MOD 45 MIN: CPT | Performed by: FAMILY MEDICINE

## 2021-04-14 RX ORDER — BUPRENORPHINE HYDROCHLORIDE, NALOXONE HYDROCHLORIDE 12; 3 MG/1; MG/1
1 FILM, SOLUBLE BUCCAL; SUBLINGUAL DAILY
Qty: 3 FILM | Refills: 0 | Status: SHIPPED | OUTPATIENT
Start: 2021-04-14 | End: 2021-04-19

## 2021-04-14 NOTE — PROGRESS NOTES
SUBJECTIVE                                                      Mati Costa is a 21 year old male who presents for  initial visit for addiction consultation and management referred by  Detox due to concerns for Opioid Use Disorder (OUD)    Visit performed In Person, face-to-face    HPI:   Mati Costa is a 21 year old male with history of opioid use who presents for further evaluation of possible substance use disorder and management options.    Mati attended detox from 4/2 - 4/5 for opioid and alcohol use disorders. Today reports his alcohol use was meant to help qualify him for a stay at detox, and that he doesn't have a significant problem with alcohol. After detox he was committed to recovery and enthusiastic, however he found out that his girlfriend was seeing another karishma. Returned to use - heroin 1g 4/7. Completed a methadone taper 30/20/10 while at detox.    Wants to get on sublocade ASAP. When he takes any formulation of suboxone, tablets or films, he gets nauseous and often vomits or dry heaves. Cannot stay on this as a maintenance med as a result. HAs heard that sublocade offers the ability to be off opioids/suboxone with much less withdrawal and is interested in this.    Substance Use History:   ROUTE OF SUBSTANCE ADMINISTRATION - Heroin - last used yesterday, injects and sometimes snorts    LONGEST PERIOD OF SOBRIETY - 8 months, last year got out of treatment    - Significant protective factors - living in a sober house was helpful     PREVIOUS DETOX/TREATMENT PROGRAMS - 2 years ago, 3 years ago     HISTORY OF OVERDOSE - Since December - 15 times. Has Narcan.    PREVIOUS MEDICATION ASSISTED TREATMENT -  previous course of suboxone 2018-9 for short period of time. Was somewhat helpful. Was difficult to take every day due to GI side effects. Usually takes through withdrawal then stops. Has bought off the street. Last took day after detox.  Methadone taken but not prescribed.    CURRENT  RECOVERY ACTIVITIES - outpatient [facility], hasn't gotten back to him yet for intake     Other Substances:    ALCOHOL- minimal, don't use alcohol   MARIJUANA- none  PRESCRIPTION STIMULANTS- none  COCAINE/CRACK- used coke one week ago - injection   METH/AMPHETAMINES-not in last year, used in the past   OPIATES- heroin - tries to use clean needles but not always   BENZODIAZEPINES- Hasn't done recently, worried it's in heroin, has had pos urine  KRATOM- none  HALLUCINOGENS - none  OTHER - none  No gambling     NICOTINE-vapes   Desire to quit - not right now           Previous attempts to quit - used to smoke cigarettes, has not tried quitting         Hx of medication for smoking cessation: NRT patches    Infectious disease screening UTD?  Yes   - HIV test date: 2021   - HepC test date: 2021      Additional Pertinent History (Updated in Epic as appropriate)  PAST PSYCHIATRIC HISTORY     Diagnosed with BPD - drug induced psychosis?  Anxiety - takes Celexa  Trauma - none reported       SOCIAL HISTORY:  Living situation:  Apartment, living alone   Single///partner Recently broke up with GF   Children None   Support system: Sober house friends, recovery friends, brother, parents   Employment: [id number] keeping    Barriers to Care (transportation, childcare, etc): None   Upcoming Court Date(s): none   Consent to Communicate? n/a     Medical History:    Patient Active Problem List    Diagnosis Date Noted     Alcohol abuse 04/03/2021     Priority: Medium     Opioid abuse (H) 04/03/2021     Priority: Medium     Psychotic disorder (H) 06/05/2018     Priority: Medium       Past Medical History:   Diagnosis Date     Alcohol abuse      Anxiety      Depressive disorder      Insomnia      Opiate dependence (H)      Substance abuse (H)        Past Surgical History:   Procedure Laterality Date     ENT SURGERY      tubes as child         No family history on file.  Mom's side - alcoholism  Parents do  not have problems  No mental health issues       Current Outpatient Medications   Medication Sig Dispense Refill     Buprenorphine HCl-Naloxone HCl (ZUBSOLV) 8.6-2.1 MG SUBL Place 1 tablet under the tongue daily 7 tablet 0     citalopram (CELEXA) 20 MG tablet Take 20 mg by mouth every morning        multivitamin w/minerals (THERA-VIT-M) tablet Take 1 tablet by mouth daily 30 tablet 0     naloxone (NARCAN) 4 MG/0.1ML nasal spray Spray 1 spray (4 mg) into one nostril alternating nostrils as needed for opioid reversal every 2-3 minutes until assistance arrives 0.2 mL 0     ondansetron (ZOFRAN-ODT) 4 MG ODT tab Take 1 tablet (4 mg) by mouth every 8 hours as needed for nausea 10 tablet 1     SUBOXONE 12-3 MG FILM per film Place 1 Film under the tongue daily 3 Film 0     thiamine (B-1) 100 MG tablet Take 1 tablet (100 mg) by mouth daily 30 tablet 0     traZODone (DESYREL) 100 MG tablet Take 100 mg by mouth At Bedtime         No Known Allergies        REVIEW OF SYSTEMS:  Otherwise negative        OBJECTIVE                                                      EXAM    There were no vitals taken for this visit.    GENERAL: lethargic but denies distress  EYES: Eyes grossly normal to inspection  RESP: No respiratory distress  SKIN: excoriations scattered on his face  MENTAL STATUS EXAM  Appearance/Behavior: No appearant distress  Speech: Slurred  Mood/Affect: flat and appears intoxicated  Insight: Limited      LAB  No results found for any visits on 04/14/21.  Will require lab testing upon establishing care later this week    [facility] Board of Pharmacy Data Base Reviewed;    Consistent with patient reports and Epic records.           A/P                                                      ASSESSMENT/PLAN:  Mati was seen today for  follow up and recheck medication.    Diagnoses and all orders for this visit:    Opioid use disorder (H)  -     SUBOXONE 12-3 MG FILM per film; Place 1 Film under the tongue daily    -  Patient very interested in sublocade.  - Requires 7d suboxone maintenance before applying for insurance authorization  - Has tried suboxone in the past, but has nausea and dry heaves or vomiting with every use. Willing to try to tolerate this in order to stabilize  - Advised he needs to be seen in person on Friday to pursue further care with sublocade  - Advised he should wait 24hr after last use of opioids before first use of suboxone. Has used within the past 24hr  - Declines medications for withdrawal mgmt        ENCOUNTER FOR LONG TERM USE OF HIGH RISK MEDICATION   High Risk Drug Monitoring?  YES   Drug being monitored: buprenorphine   Reason for drug: Opioid Use Disorder   What is being monitored?: Dosage, Cravings, Triggers and side effects       Counseled the patient on the importance of having a recovery program in addition to medication to manage recovery.  Components include avoiding isolating, having willingness to change, avoiding triggers and managing cravings. Encouraged having some type of sober network and practicing honesty with trusted support person(s). Encouraged other services such as counseling, 12 step or other self-help organizations.      Opioid warning reviewed.  Risk of overdose following a period of abstinence due to decrease tolerance was discussed including risk of death.  Strongly recommended abstain from alcohol, benzodiazepines, THC, opioids and other drugs of abuse.  Increased risk of return to opioid use after use of these substances discussed.  Increased risk of overdose/death with use of other substances particularly benzodiazepines/alcohol reviewed.      RTC   2 days      Sharon Young, PGY-2 FM resident    Christopher Paez MD  [facility]  [phone]    Christopher Paez MD  This note was comprehensively reviewed and edited by myself prior to signing. I was present with the patient and resident during the entire exam, assessment and  plan.

## 2021-04-14 NOTE — PROGRESS NOTES
Mati is a 21 year old who is being evaluated via a billable video visit.      How would you like to obtain your AVS? [employer]  If the video visit is dropped, the invitation should be resent by: Text to cell phone: [phone]  Will anyone else be joining your video visit? No

## 2021-04-14 NOTE — LETTER
To Whom It May Concern;    This patient has a history of opioid use disorder, severe.  Sublocade has been prescribed at the recommended dose of 300 mg monthly for two months then 100 mg monthly.      This patient has been stabilized on a Buprenorphine containing medication at a does of between 8mg and 24mg daily with control of cravings and withdrawal symptoms for at least 7 days.       I am a [facility] provider and the patient is prescribed Sublocade through that program at our facility.  This program includes comprehensive medical and behavioral treatment including psychosocial support. Urine drug screens are performed at each visit and reviewed appropriately.     My Waiver is [id number] Under the DATA 2000 Waiver.      This patient's medication list has been reviewed fully.      This patient has been advised about the risks of concomitant use of benzodiazepines, sedatives, other opioid analgesics, alcohol or other CNS depressants.        The patient will be transitioned off transmucosal buprenorphine at the time of starting Sublocade.   Patient is not receiving Methadone or any other long acting buprenorphine product or Vivitrol/naltrexone.     This patient has no preexisting moderate to severe hepatic impairment at this time.     he does not have a history or family history of prolonged QT syndrome. @ is not on any medications that will prolong QT interval.      Please authorize Sublocade for this patient.  If you have any questions please feel free to call my office at number above.      Sincerely,      Christopher Paez MD

## 2021-04-16 ENCOUNTER — NURSE TRIAGE (OUTPATIENT)
Dept: NURSING | Facility: CLINIC | Age: 21
End: 2021-04-16

## 2021-04-16 ENCOUNTER — OFFICE VISIT (OUTPATIENT)
Dept: ADDICTION MEDICINE | Facility: CLINIC | Age: 21
End: 2021-04-16
Payer: COMMERCIAL

## 2021-04-16 VITALS
OXYGEN SATURATION: 97 % | HEART RATE: 83 BPM | SYSTOLIC BLOOD PRESSURE: 106 MMHG | DIASTOLIC BLOOD PRESSURE: 74 MMHG | TEMPERATURE: 98.5 F | BODY MASS INDEX: 17.85 KG/M2 | WEIGHT: 131.6 LBS

## 2021-04-16 DIAGNOSIS — F11.90 OPIOID USE DISORDER: ICD-10-CM

## 2021-04-16 DIAGNOSIS — R11.0 NAUSEA: ICD-10-CM

## 2021-04-16 DIAGNOSIS — F11.90 OPIOID USE DISORDER: Primary | ICD-10-CM

## 2021-04-16 LAB

## 2021-04-16 PROCEDURE — 99215 OFFICE O/P EST HI 40 MIN: CPT | Performed by: FAMILY MEDICINE

## 2021-04-16 PROCEDURE — 80306 DRUG TEST PRSMV INSTRMNT: CPT | Performed by: FAMILY MEDICINE

## 2021-04-16 RX ORDER — BUPRENORPHINE HYDROCHLORIDE AND NALOXONE HYDROCHLORIDE 8.6; 2.1 MG/1; MG/1
1 TABLET, ORALLY DISINTEGRATING SUBLINGUAL DAILY
Qty: 7 TABLET | Refills: 0 | Status: SHIPPED | OUTPATIENT
Start: 2021-04-16 | End: 2021-04-30

## 2021-04-16 RX ORDER — ONDANSETRON 4 MG/1
4 TABLET, ORALLY DISINTEGRATING ORAL EVERY 8 HOURS PRN
Qty: 10 TABLET | Refills: 1 | Status: SHIPPED | OUTPATIENT
Start: 2021-04-16 | End: 2021-05-07

## 2021-04-16 NOTE — TELEPHONE ENCOUNTER
"Pharmacist @ [facility] buprenorphine- Naloxone  prescribed today by Dr Paez - [facility] - is not covered by insurance. Dr wrote note on Rx \"Please substitute for covered alternative per insurance request\" Pharmacist says he cannot do this; he needs a new Rx. Addiction Med providers are not paged after hrs/wknds. Advised call clinic when clinic reopens Mon 4/19.   Reason for Disposition    Caller has NON-URGENT medication question about med that PCP prescribed and triager unable to answer question    Additional Information    Negative: Drug overdose and nurse unable to answer question    Negative: Caller requesting information not related to medicine    Negative: Caller requesting a prescription for Strep throat and has a positive culture result    Negative: Rash while taking a medication or within 3 days of stopping it    Negative: Immunization reaction suspected    Negative: [1] Asthma AND [2] having symptoms of asthma (cough, wheezing, etc)    Negative: MORE THAN A DOUBLE DOSE of a prescription or over-the-counter (OTC) drug    Negative: [1] DOUBLE DOSE (an extra dose or lesser amount) of over-the-counter (OTC) drug AND [2] any symptoms (e.g., dizziness, nausea, pain, sleepiness)    Negative: [1] DOUBLE DOSE (an extra dose or lesser amount) of prescription drug AND [2] any symptoms (e.g., dizziness, nausea, pain, sleepiness)    Negative: Took another person's prescription drug    Negative: [1] DOUBLE DOSE (an extra dose or lesser amount) of prescription drug AND [2] NO symptoms (Exception: a double dose of antibiotics)    Negative: Diabetes drug error or overdose (e.g., insulin or extra dose)    Negative: [1] Request for URGENT new prescription or refill of \"essential\" medication (i.e., likelihood of harm to patient if not taken) AND [2] triager unable to fill per unit policy    Negative: [1] Prescription not at pharmacy AND [2] was prescribed today by PCP    Negative: Pharmacy calling with " prescription questions and triager unable to answer question    Negative: Caller has urgent medication question about med that PCP prescribed and triager unable to answer question    Protocols used: MEDICATION QUESTION CALL-A-AH

## 2021-04-16 NOTE — PROGRESS NOTES
SUBJECTIVE                                                    SUBSTANCE USE DISORDER FOLLOW UP:    Mati Costa is a 21 year old male who presents to clinic today for follow up of Opioid Use Disorder (OUD).    Visit performed In Person, face-to-face        TODAY'S VISIT  HPI Apr 16, 2021  - Took suboxone once, then returned to use  - Uses heroin, but unsure what it contains  - No other substance use  - No withdrawals right now  - Last use early this morning  - Frustrated by the inability to tolerate suboxone d/t nausea/vomiting  - Open to starting alternative formulation today        A/P                                                    ASSESSMENT/PLAN    Diagnoses and all orders for this visit:  Opioid use disorder (H)  -     Urine Drugs of Abuse Screen Panel 13; Future  -     Buprenorphine HCl-Naloxone HCl (ZUBSOLV) 8.6-2.1 MG SUBL; Place 1 tablet under the tongue daily  -     SUBOXONE 12-3 MG FILM per film; Place 1 Film under the tongue daily  Nausea  -     ondansetron (ZOFRAN-ODT) 4 MG ODT tab; Take 1 tablet (4 mg) by mouth every 8 hours as needed for nausea      Orders Placed This Encounter   Medications     Buprenorphine HCl-Naloxone HCl (ZUBSOLV) 8.6-2.1 MG SUBL     Sig: Place 1 tablet under the tongue daily     Dispense:  7 tablet     Refill:  0     NADEAN: [id number]; Please substitute for covered alternative per insurance request.     ondansetron (ZOFRAN-ODT) 4 MG ODT tab     Sig: Take 1 tablet (4 mg) by mouth every 8 hours as needed for nausea     Dispense:  10 tablet     Refill:  1     SUBOXONE 12-3 MG FILM per film     Sig: Place 1 Film under the tongue daily     Dispense:  3 Film     Refill:  0     NADEAN: [id number]; Please substitute for covered alternative per insurance request.       - Switching to zubsolv as patient has tried/failed suboxone (both films and tablets) and subutex, as they all provide nausea and vomiting  - Will pursue sublocade as soon as we can find stability on suboxone for 7  days. Will need additional UDS on file next week as well  - Rx written for suboxone to bridge until PA can be completed for zubsolv    NEED to test for infectious disease. Pt indicates he does not share needles. Will continue to offer testing with each visit (declines today).      Patient has narcan?  Yes  Reported IVDU in past 2 months?  Yes  Infectious disease screening UTD?  No           RTC:  1 week    ENCOUNTER FOR LONG TERM USE OF HIGH RISK MEDICATION   High Risk Drug Monitoring?  YES   Drug being monitored: buprenorphine   Reason for drug: Opioid Use Disorder (OUD)   What is being monitored?: Dosage, Cravings, Trigger, side effects, and continued abstinence.    Counseled the patient on the importance of having a recovery program in addition to medication to manage recovery.  Components include avoiding isolating, having willingness to change, avoiding triggers and managing cravings. Encouraged having some type of sober network and practicing honesty with trusted support person(s). Encouraged other services such as counseling, 12 step or other self-help organizations.      Opioid warning reviewed.  Risk of overdose following a period of abstinence due to decrease tolerance was discussed including risk of death.  Strongly recommended abstain from alcohol, benzodiazepines, THC, opioids and other drugs of abuse.  Increased risk of return to opioid use after use of these substances discussed.  Increased risk of overdose/death with use of other substances particularly benzodiazepines/alcohol reviewed.    [facility] Board of Pharmacy Data Base Reviewed;   Consistent with patient reports and Epic records.        OBJECTIVE                                                    PHYSICAL EXAM:  /74   Pulse 83   Temp 98.5  F (36.9  C) (Temporal)   Wt 59.7 kg (131 lb 9.6 oz)   SpO2 97%   BMI 17.85 kg/m      GENERAL: healthy, alert and no distress  EYES: Eyes grossly normal to inspection, PERRL and conjunctivae  and sclerae normal  RESP: No respiratory distress  SKIN: excoriated, no focal erythema or swelling noted  MENTAL STATUS EXAM  Appearance/Behavior: Restless  Speech: Normal  Mood/Affect: depressed affect  Insight: Fair    LAB  Results for orders placed or performed in visit on 04/16/21   Urine Drugs of Abuse Screen Panel 13     Status: Abnormal   Result Value Ref Range    Cannabinoids (82-grd-6-carboxy-9-THC) Not Detected NDET^Not Detected ng/mL    Phencyclidine (Phencyclidine) Not Detected NDET^Not Detected ng/mL    Cocaine (Benzoylecgonine) Not Detected NDET^Not Detected ng/mL    Methamphetamine (d-Methamphetamine) Not Detected NDET^Not Detected ng/mL    Opiates (Morphine) Detected, Abnormal Result (A) NDET^Not Detected ng/mL    Amphetamine (d-Amphetamine) Not Detected NDET^Not Detected ng/mL    Benzodiazepines (Nordiazepam) Not Detected NDET^Not Detected ng/mL    Tricyclic Antidepressants (Desipramine) Not Detected NDET^Not Detected ng/mL    Methadone (Methadone) Not Detected NDET^Not Detected ng/mL    Barbiturates (Butalbital) Not Detected NDET^Not Detected ng/mL    Oxycodone (Oxycodone) Not Detected NDET^Not Detected ng/mL    Propoxyphene (Norpropoxyphene) Not Detected NDET^Not Detected ng/mL    Buprenorphine (Buprenorphine) Not Detected NDET^Not Detected ng/mL         HISTORY                                                    Problem list reviewed & adjusted, as indicated.  Patient Active Problem List   Diagnosis     Psychotic disorder (H)     Alcohol abuse     Opioid abuse (H)         MEDICATION LIST (prior to visit)  citalopram (CELEXA) 20 MG tablet, Take 20 mg by mouth every morning   multivitamin w/minerals (THERA-VIT-M) tablet, Take 1 tablet by mouth daily  naloxone (NARCAN) 4 MG/0.1ML nasal spray, Spray 1 spray (4 mg) into one nostril alternating nostrils as needed for opioid reversal every 2-3 minutes until assistance arrives  thiamine (B-1) 100 MG tablet, Take 1 tablet (100 mg) by mouth daily  traZODone  (DESYREL) 100 MG tablet, Take 100 mg by mouth At Bedtime    No current facility-administered medications on file prior to visit.       No Known Allergies        Christopher Paez MD  [facility]  [phone]

## 2021-04-19 RX ORDER — BUPRENORPHINE HYDROCHLORIDE, NALOXONE HYDROCHLORIDE 12; 3 MG/1; MG/1
1 FILM, SOLUBLE BUCCAL; SUBLINGUAL DAILY
Qty: 3 FILM | Refills: 0 | Status: SHIPPED | OUTPATIENT
Start: 2021-04-19 | End: 2021-05-07

## 2021-04-20 ENCOUNTER — TELEPHONE (OUTPATIENT)
Dept: ADDICTION MEDICINE | Facility: CLINIC | Age: 21
End: 2021-04-20

## 2021-04-20 NOTE — TELEPHONE ENCOUNTER
Spoke with pharmacy. Prescription for Zubsolv 8.6-2.1 SL tablet requires prior authorization. Appears we have not received/processed this paperwork.    PA team, please initiate process for coverage of zubsolv.    Patient has tried/failed suboxone films and tablets. He vomits and is unable to tolerate both versions of the medication.    Christopher Paez MD

## 2021-04-22 NOTE — TELEPHONE ENCOUNTER
Called pharmacy to give them direct fax# to our RN/OBC team. Prior authorization was faxed to [facility] on Friday last week. This has not been processed yet.    Manuel indicated they would fax the prior authorization to [phone] this morning.    RN staff, please call Manuel to follow-up if this has not been received by 9am, per their request.    Please route this to me and prior auth team when received.    Christopher Paez MD

## 2021-04-22 NOTE — TELEPHONE ENCOUNTER
Prior Authorization Approval    Authorization Effective Date: 2/1/2021  Authorization Expiration Date: 4/22/2022  Medication: ZUBSOLV 8.6-2.1 MG SUBL  Approved Dose/Quantity:   Reference #: [id number]   Insurance Company: [employer] - Phone [phone] Fax [phone]  Expected CoPay:       CoPay Card Available:      Foundation Assistance Needed:    Which Pharmacy is filling the prescription (Not needed for infusion/clinic administered): [id number] - [place] - [address] AT [facility] OF [address]  Pharmacy Notified: Yes  Patient Notified: Yes  **Instructed pharmacy to notify patient when script is ready to /ship.**

## 2021-04-22 NOTE — TELEPHONE ENCOUNTER
PA Initiation    Medication: ZUBSOLV 8.6-2.1 MG SUBL  Insurance Company: [employer] - Phone [phone] Fax [phone]  Pharmacy Filling the Rx: [id number] - [place] - [address]  AT [facility] [place] [address]  Filling Pharmacy Phone: [phone]  Filling Pharmacy Fax: [phone]  Start Date: 4/22/2021

## 2021-04-23 ENCOUNTER — TELEPHONE (OUTPATIENT)
Dept: FAMILY MEDICINE | Facility: CLINIC | Age: 21
End: 2021-04-23

## 2021-04-23 NOTE — TELEPHONE ENCOUNTER
Received refill request for Zubsolv8.6-2.1 mg subl. According to , patient picked up Suboxone 12-3 mg sublingual film on 4/16/2021-3 films. Called pharmacy and spoke with Traci pharmacy tech who said that patient Zubsolv has not been picked up by him. It was filled yesterday.  Called patient and the phone went to voicemail. No means to leave voice message. Patient voice mail not set up.

## 2021-04-23 NOTE — TELEPHONE ENCOUNTER
Attempted to call sourav several times to inform him medication was available.    No answer, unable to LVM.    If he calls, please let him know medicaiton is available at his pharmacy.    Christopher Paez MD

## 2021-04-24 ENCOUNTER — HEALTH MAINTENANCE LETTER (OUTPATIENT)
Age: 21
End: 2021-04-24

## 2021-04-29 ENCOUNTER — TELEPHONE (OUTPATIENT)
Dept: ADDICTION MEDICINE | Facility: CLINIC | Age: 21
End: 2021-04-29

## 2021-04-29 DIAGNOSIS — F11.90 OPIOID USE DISORDER: Primary | ICD-10-CM

## 2021-04-29 NOTE — TELEPHONE ENCOUNTER
Reason for Call:  Other appointment and call back    Detailed comments: Was unable to reach patient (try 2x). The number was not set up for a vm, was unable to leave vm. Will try again later.    Phone Number Patient can be reached at: Home number on file [phone] (home)    Best Time: Any    Can we leave a detailed message on this number? NO    Call taken on 4/29/2021 at 9:52 AM by Jameel Monroe

## 2021-04-30 PROBLEM — F11.90 OPIOID USE DISORDER: Status: ACTIVE | Noted: 2021-04-30

## 2021-04-30 RX ORDER — BUPRENORPHINE HYDROCHLORIDE AND NALOXONE HYDROCHLORIDE 8.6; 2.1 MG/1; MG/1
1 TABLET, ORALLY DISINTEGRATING SUBLINGUAL DAILY
Qty: 7 TABLET | Refills: 0 | Status: SHIPPED | OUTPATIENT
Start: 2021-04-30 | End: 2021-06-01

## 2021-04-30 RX ORDER — LIDOCAINE HYDROCHLORIDE 10 MG/ML
2 INJECTION, SOLUTION EPIDURAL; INFILTRATION; INTRACAUDAL; PERINEURAL ONCE
Status: CANCELLED | OUTPATIENT
Start: 2021-04-30 | End: 2021-04-30

## 2021-04-30 NOTE — TELEPHONE ENCOUNTER
Patient has been stable on buprenorphine for nearly 2 weeks.    Refill ordered for zubsolv for 1 week.    Order placed for sublocade. Sending to finance specialists.    Christopher Paez MD

## 2021-04-30 NOTE — TELEPHONE ENCOUNTER
Patient needs to come for a urine screen to show that he is taking buprenorphine. Had only taken one dose after our last appt, and that was not on the same day, so the levels must have been undetectable.    Informed him via Urvew that he needs to call to make a lab appt and come for a urine test. He can do so any time. Order placed.    Christopher Paez MD

## 2021-05-01 NOTE — TELEPHONE ENCOUNTER
Called and spoke to Mati. Advised he can present to outpatient lab at [facility] this weekend for a urine sample.    Christopher Paez MD

## 2021-05-04 NOTE — TELEPHONE ENCOUNTER
Spoke with Mati. Advised he can present to [facility] for a lab test, as this is close to home. Provided their # for scheduling. Lab order placed last week.    Christopher Paez MD

## 2021-05-07 ENCOUNTER — APPOINTMENT (OUTPATIENT)
Dept: CT IMAGING | Facility: CLINIC | Age: 21
End: 2021-05-07
Attending: EMERGENCY MEDICINE
Payer: COMMERCIAL

## 2021-05-07 ENCOUNTER — HOSPITAL ENCOUNTER (EMERGENCY)
Facility: CLINIC | Age: 21
Discharge: HOME | End: 2021-05-07
Attending: EMERGENCY MEDICINE | Admitting: EMERGENCY MEDICINE
Payer: COMMERCIAL

## 2021-05-07 VITALS
SYSTOLIC BLOOD PRESSURE: 94 MMHG | HEIGHT: 72 IN | RESPIRATION RATE: 16 BRPM | DIASTOLIC BLOOD PRESSURE: 60 MMHG | BODY MASS INDEX: 17.61 KG/M2 | OXYGEN SATURATION: 98 % | TEMPERATURE: 99 F | WEIGHT: 130 LBS | HEART RATE: 100 BPM

## 2021-05-07 DIAGNOSIS — L03.213 PERIORBITAL CELLULITIS OF LEFT EYE: ICD-10-CM

## 2021-05-07 LAB
ANION GAP SERPL CALCULATED.3IONS-SCNC: 1 MMOL/L (ref 3–14)
BASOPHILS # BLD AUTO: 0 10E9/L (ref 0–0.2)
BASOPHILS NFR BLD AUTO: 0.2 %
BUN SERPL-MCNC: 15 MG/DL (ref 7–30)
CALCIUM SERPL-MCNC: 8.7 MG/DL (ref 8.5–10.1)
CHLORIDE SERPL-SCNC: 104 MMOL/L (ref 94–109)
CO2 SERPL-SCNC: 32 MMOL/L (ref 20–32)
CREAT SERPL-MCNC: 0.83 MG/DL (ref 0.66–1.25)
DIFFERENTIAL METHOD BLD: ABNORMAL
EOSINOPHIL # BLD AUTO: 0.2 10E9/L (ref 0–0.7)
EOSINOPHIL NFR BLD AUTO: 1.6 %
ERYTHROCYTE [DISTWIDTH] IN BLOOD BY AUTOMATED COUNT: 12.3 % (ref 10–15)
GFR SERPL CREATININE-BSD FRML MDRD: >90 ML/MIN/{1.73_M2}
GLUCOSE SERPL-MCNC: 89 MG/DL (ref 70–99)
HCT VFR BLD AUTO: 36.8 % (ref 40–53)
HGB BLD-MCNC: 12.4 G/DL (ref 13.3–17.7)
IMM GRANULOCYTES # BLD: 0 10E9/L (ref 0–0.4)
IMM GRANULOCYTES NFR BLD: 0.4 %
LYMPHOCYTES # BLD AUTO: 1.5 10E9/L (ref 0.8–5.3)
LYMPHOCYTES NFR BLD AUTO: 13.9 %
MCH RBC QN AUTO: 29.5 PG (ref 26.5–33)
MCHC RBC AUTO-ENTMCNC: 33.7 G/DL (ref 31.5–36.5)
MCV RBC AUTO: 87 FL (ref 78–100)
MONOCYTES # BLD AUTO: 1 10E9/L (ref 0–1.3)
MONOCYTES NFR BLD AUTO: 9.1 %
NEUTROPHILS # BLD AUTO: 8.1 10E9/L (ref 1.6–8.3)
NEUTROPHILS NFR BLD AUTO: 74.8 %
NRBC # BLD AUTO: 0 10*3/UL
NRBC BLD AUTO-RTO: 0 /100
PLATELET # BLD AUTO: 212 10E9/L (ref 150–450)
POTASSIUM SERPL-SCNC: 3.8 MMOL/L (ref 3.4–5.3)
RBC # BLD AUTO: 4.21 10E12/L (ref 4.4–5.9)
SODIUM SERPL-SCNC: 137 MMOL/L (ref 133–144)
WBC # BLD AUTO: 10.8 10E9/L (ref 4–11)

## 2021-05-07 PROCEDURE — 250N000013 HC RX MED GY IP 250 OP 250 PS 637: Performed by: EMERGENCY MEDICINE

## 2021-05-07 PROCEDURE — 70481 CT ORBIT/EAR/FOSSA W/DYE: CPT

## 2021-05-07 PROCEDURE — 250N000011 HC RX IP 250 OP 636: Performed by: EMERGENCY MEDICINE

## 2021-05-07 PROCEDURE — 99285 EMERGENCY DEPT VISIT HI MDM: CPT | Mod: 25

## 2021-05-07 PROCEDURE — 85025 COMPLETE CBC W/AUTO DIFF WBC: CPT | Performed by: EMERGENCY MEDICINE

## 2021-05-07 PROCEDURE — 80048 BASIC METABOLIC PNL TOTAL CA: CPT | Performed by: EMERGENCY MEDICINE

## 2021-05-07 PROCEDURE — 250N000009 HC RX 250: Performed by: EMERGENCY MEDICINE

## 2021-05-07 RX ORDER — IOPAMIDOL 755 MG/ML
50 INJECTION, SOLUTION INTRAVASCULAR ONCE
Status: COMPLETED | OUTPATIENT
Start: 2021-05-07 | End: 2021-05-07

## 2021-05-07 RX ORDER — SULFAMETHOXAZOLE/TRIMETHOPRIM 800-160 MG
1 TABLET ORAL 2 TIMES DAILY
Qty: 20 TABLET | Refills: 0 | Status: SHIPPED | OUTPATIENT
Start: 2021-05-07 | End: 2021-05-17

## 2021-05-07 RX ORDER — CEPHALEXIN 500 MG/1
500 CAPSULE ORAL ONCE
Status: COMPLETED | OUTPATIENT
Start: 2021-05-07 | End: 2021-05-07

## 2021-05-07 RX ORDER — SULFAMETHOXAZOLE/TRIMETHOPRIM 800-160 MG
1 TABLET ORAL ONCE
Status: COMPLETED | OUTPATIENT
Start: 2021-05-07 | End: 2021-05-07

## 2021-05-07 RX ORDER — IBUPROFEN 200 MG
400 TABLET ORAL EVERY 6 HOURS PRN
COMMUNITY
End: 2021-06-24

## 2021-05-07 RX ORDER — CEPHALEXIN 500 MG/1
500 CAPSULE ORAL 2 TIMES DAILY
Qty: 20 CAPSULE | Refills: 0 | Status: SHIPPED | OUTPATIENT
Start: 2021-05-07 | End: 2021-05-17

## 2021-05-07 RX ADMIN — SODIUM CHLORIDE 60 ML: 9 INJECTION, SOLUTION INTRAVENOUS at 20:59

## 2021-05-07 RX ADMIN — CEPHALEXIN 500 MG: 500 CAPSULE ORAL at 21:50

## 2021-05-07 RX ADMIN — IOPAMIDOL 50 ML: 755 INJECTION, SOLUTION INTRAVENOUS at 20:59

## 2021-05-07 RX ADMIN — SULFAMETHOXAZOLE AND TRIMETHOPRIM 1 TABLET: 800; 160 TABLET ORAL at 21:50

## 2021-05-07 ASSESSMENT — ENCOUNTER SYMPTOMS
SHORTNESS OF BREATH: 0
EYE DISCHARGE: 1
FACIAL SWELLING: 1
EYE PAIN: 1

## 2021-05-07 ASSESSMENT — MIFFLIN-ST. JEOR: SCORE: 1632.68

## 2021-05-08 NOTE — ED TRIAGE NOTES
Patient has had a pimple and some minor eye swelling for about 1 week. Patient reports eye swelling increased starting wednesday. Now left side of face is swollen and eye is swollen shut. Patient airway is patent. Patient managing secretions. Denies any shortness of breath.     Alert and Oriented to person, place, time and situation. Airway patent.    Respirations are regular and unlabored.  Patient talking in full sentences. Patient denies cough or shortness of breath.    Pulses are strong and regular with palpation. Skin is normal color, warm and dry. Cap refill is less than 3 seconds.  Patient denies chest pain/pressure.

## 2021-05-08 NOTE — ED PROVIDER NOTES
History   Chief Complaint:  Facial Swelling      The history is provided by the patient and a parent.      Mati Costa is a 21 year old male with history of opiate dependence/abuse who presents with facial swelling. The patient reports that he had popped a pimple around a week ago and has had increased swelling since with worsening swelling starting Wednesday. He states that his whole face with eyebrow, cheek, and eyelid are swollen. His father notes that around a few weeks ago he gave his son a course of amoxicillin after he had a wound of his right arm.  He states that it resolved. He also notes that he may have had pink eye but unsure. His father denies history of MRSA. The patient does note that he has pain with movement of the eye.       Review of Systems   HENT: Positive for facial swelling.    Eyes: Positive for pain and discharge.   Respiratory: Negative for shortness of breath.    All other systems reviewed and are negative.      Allergies:  No known drug allergies     Medications:  Celexa  Narcan   Zubsolv   Trazodone     Past Medical History:    Alcohol abuse   Anxiety  Depressive disorder  Insomnia   Opiate dependence   Substance abuse   Opioid use disorder  Psychotic disorder     Past Surgical History:    ENT surgery    Family History:    The patient denies past family history.     Social History:  Smoking status: never smoker  Alcohol use: no  Drug use: yes, history of drug abuse  PCP: Viry Jiang  Presents to the ED with father    Physical Exam     Patient Vitals for the past 24 hrs:   BP Temp Temp src Pulse Resp SpO2 Height Weight   05/07/21 1923 113/64 99  F (37.2  C) Temporal 86 20 99 % 1.829 m (6') 59 kg (130 lb)       Physical Exam  General/Appearance: appears stated age, appears uncomfortable and mildly sedated  Eyes: L upper and lower lids edematous and erythematous with ttp -- unable to open them to visualize the eye  ENT: MMM, mild L facial swelling  Neck: supple, nl ROM, no  stiffness  Cardiovascular: RRR, nl S1S2, no m/r/g, 2+ pulses in all 4 extremities, cap refill <2sec  Respiratory: CTAB, good air movement throughout, no wheezes/rhonchi/rales, no increased WOB, no retractions  MSK: ZULUAGA, good tone, no bony abnormality  Skin: warm and well-perfused, no rash, no edema, no ecchymosis, nl turgor  Neuro: GCS 15, alert and oriented  Heme: no petechia, no purpura, no active bleeding    Emergency Department Course     Imaging:  Orbital w contrast CT:  There is soft tissue swelling of the upper and lower   eyelid and forehead on the left consistent with cellulitis. There is   no definite evidence for intraorbital extension of this presumed   cellulitis. The orbits and globes bilaterally are unremarkable. No   facial bone fractures. No sinusitis.     Reading per radiology    Laboratory:  CBC: WBC 10.8, HGB 12.4(L),    BMP: Anion gap 1(L), o/w WNL (Creatinine: 0.83)      Emergency Department Course:  Reviewed:  I reviewed the patient's nursing notes, vitals, past medical records, Care Everywhere.     Assessments:  2030 I performed an assessment and examination of the patient as noted above.      2135 Findings and plan explained to the Patient and father. Patient discharged home with instructions regarding supportive care, medications, and reasons to return. The importance of close follow-up was reviewed.     Interventions:  2150 Keflex, 500 mg, PO  2150 Bactrim 160 mg, PO    Disposition:  The patient was discharged to home.       Impression & Plan   Medical Decision Making:  Mati Costa is a 21 year old male with a history of opiate dependence who presents with facial swelling.  Over the past several days he has had increasing swelling to his left side of his face, including his eyelids, after popping is it.  CT orbit was obtained as I was really unable to do a physical exam on the actual eye itself due to the degree of edema and pain associated with trying to mobilize his lids.   Thankfully there was no sign of orbital cellulitis on the CT.  There is no other evidence on physical exam of progression and deep space infection so I think it is reasonable to treat with Keflex and Bactrim.  Ultimately I think it safe to do this as an outpatient basis.  The patient feels comfortable with this plan.    Covid-19  Mati Costa was evaluated during a global COVID-19 pandemic, which necessitated consideration that the patient might be at risk for infection with the SARS-CoV-2 virus that causes COVID-19.   Applicable protocols for evaluation were followed during the patient's care.     Diagnosis:    ICD-10-CM    1. Periorbital cellulitis of left eye  L03.213        Discharge Medications:  New Prescriptions    CEPHALEXIN (KEFLEX) 500 MG CAPSULE    Take 1 capsule (500 mg) by mouth 2 times daily for 10 days    SULFAMETHOXAZOLE-TRIMETHOPRIM (BACTRIM DS) 800-160 MG TABLET    Take 1 tablet by mouth 2 times daily for 10 days       Scribe Disclosure:  I, Raven Shay, am serving as a scribe at 8:30 PM on 5/7/2021 to document services personally performed by Mellisa Guevara based on my observations and the provider's statements to me.           Mellisa Guevara MD  05/07/21 1619

## 2021-05-31 ENCOUNTER — HOSPITAL ENCOUNTER (EMERGENCY)
Facility: CLINIC | Age: 21
Discharge: HOME | End: 2021-05-31
Attending: EMERGENCY MEDICINE | Admitting: EMERGENCY MEDICINE
Payer: COMMERCIAL

## 2021-05-31 VITALS
SYSTOLIC BLOOD PRESSURE: 110 MMHG | HEART RATE: 69 BPM | OXYGEN SATURATION: 98 % | TEMPERATURE: 97.2 F | BODY MASS INDEX: 17.36 KG/M2 | WEIGHT: 128 LBS | DIASTOLIC BLOOD PRESSURE: 62 MMHG

## 2021-05-31 DIAGNOSIS — F11.10 OPIATE ABUSE, CONTINUOUS (H): ICD-10-CM

## 2021-05-31 DIAGNOSIS — Z78.9 ALCOHOL USE: ICD-10-CM

## 2021-05-31 LAB — ALCOHOL BREATH TEST: 0.07 (ref 0–0.01)

## 2021-05-31 PROCEDURE — 99284 EMERGENCY DEPT VISIT MOD MDM: CPT | Performed by: EMERGENCY MEDICINE

## 2021-05-31 PROCEDURE — 82075 ASSAY OF BREATH ETHANOL: CPT | Performed by: EMERGENCY MEDICINE

## 2021-05-31 PROCEDURE — 99283 EMERGENCY DEPT VISIT LOW MDM: CPT | Performed by: EMERGENCY MEDICINE

## 2021-05-31 ASSESSMENT — ENCOUNTER SYMPTOMS
CONFUSION: 0
VOMITING: 0
ARTHRALGIAS: 0
COUGH: 0
HEADACHES: 0
PALPITATIONS: 0
FEVER: 0
DIARRHEA: 0
ABDOMINAL PAIN: 0
SORE THROAT: 0
EYE REDNESS: 0
NAUSEA: 0
COLOR CHANGE: 0
SHORTNESS OF BREATH: 0
CHILLS: 0

## 2021-06-01 ENCOUNTER — HOSPITAL ENCOUNTER (EMERGENCY)
Facility: CLINIC | Age: 21
Discharge: HOME | End: 2021-06-01
Attending: EMERGENCY MEDICINE | Admitting: EMERGENCY MEDICINE
Payer: COMMERCIAL

## 2021-06-01 ENCOUNTER — TELEPHONE (OUTPATIENT)
Dept: ADDICTION MEDICINE | Facility: CLINIC | Age: 21
End: 2021-06-01

## 2021-06-01 ENCOUNTER — TELEPHONE (OUTPATIENT)
Dept: BEHAVIORAL HEALTH | Facility: CLINIC | Age: 21
End: 2021-06-01

## 2021-06-01 VITALS
BODY MASS INDEX: 17.08 KG/M2 | DIASTOLIC BLOOD PRESSURE: 76 MMHG | OXYGEN SATURATION: 100 % | SYSTOLIC BLOOD PRESSURE: 104 MMHG | RESPIRATION RATE: 18 BRPM | WEIGHT: 126.1 LBS | HEIGHT: 72 IN | HEART RATE: 80 BPM | TEMPERATURE: 97.7 F

## 2021-06-01 DIAGNOSIS — F11.90 OPIOID USE DISORDER: ICD-10-CM

## 2021-06-01 DIAGNOSIS — F11.10 OPIOID ABUSE (H): ICD-10-CM

## 2021-06-01 LAB — ALCOHOL BREATH TEST: 0 (ref 0–0.01)

## 2021-06-01 PROCEDURE — 99283 EMERGENCY DEPT VISIT LOW MDM: CPT | Performed by: EMERGENCY MEDICINE

## 2021-06-01 PROCEDURE — 82075 ASSAY OF BREATH ETHANOL: CPT | Performed by: EMERGENCY MEDICINE

## 2021-06-01 RX ORDER — BUPRENORPHINE HYDROCHLORIDE AND NALOXONE HYDROCHLORIDE 8.6; 2.1 MG/1; MG/1
1 TABLET, ORALLY DISINTEGRATING SUBLINGUAL DAILY
Qty: 7 TABLET | Refills: 0 | Status: SHIPPED | OUTPATIENT
Start: 2021-06-01 | End: 2021-06-09

## 2021-06-01 ASSESSMENT — ENCOUNTER SYMPTOMS
SHORTNESS OF BREATH: 0
SLEEP DISTURBANCE: 1
COUGH: 0
FEVER: 0

## 2021-06-01 ASSESSMENT — MIFFLIN-ST. JEOR: SCORE: 1614.99

## 2021-06-01 NOTE — TELEPHONE ENCOUNTER
S:4:09PM- Dr. Matute called to request inpt detox for 21 yrs old male in the [facility] ED.    B: Pt presents to the ED seeking detox from alcohol use and polysubstance abuse.  Pt reports using a gram of heroin daily for the last 3 yrs.  His MARCY was an hour prior to arrival.  Alcohol is more intermittent.  Pt reports 3 drinks of alcohol yesterday, and before that he was drinking a few days in a role.  Pt was not able to say how much he has been drinking.  Pt was admitted in April by Dr. Blakely and Pt was told he could come back.  Pt is seen by Natanael at the Addiction clinic and recommends to make an exception for this patient to come in.  Pt denies other use besides heroin and occasional alcohol use.  Denies a hx of w/d seizures or DTs.      Pt has no physical complaints.  Labs are pending.      COVID:  UTOX:  CBC:  CMP:   Vitals are stable  Alcohol breath is at 0.     A: Vol    R:  Intake discussed with Dr. Blakely about Pt.  She consented to bring Pt in.     4:45PM- Pt discharged to home.

## 2021-06-01 NOTE — DISCHARGE INSTRUCTIONS
Please follow up in [facility] which is a walk-in clinic that is open Monday, Wednesday, and Friday from 9 am-  3 pm. Please go there to continue treatment.     You unfortunately do not meet criteria for needing inpatient detoxification in the hospital.     Please make an appointment to follow up with Your Primary Care Provider in 2-4 days even if entirely better.

## 2021-06-01 NOTE — TELEPHONE ENCOUNTER
Patient seen in the ED the past two days hoping for detox, but was not admitted based on his presenting concerns. He is an IV heroin user, was attempting to stabilize on zubsolv and sublocade earlier this month but was unable to come to a clinic location to provide a urine sample so therefore his insurance did not approve sublocade.    Talked with Mati's mom regarding appropriate coordination for CD evaluation and [facility] admission. He has an intake scheduled for Thursday afternoon at 1pm. Indicated that, after his evaluation, he will gain insurance clearance to admit to [facility], though it may take several more days for admission after evaluation has been completed. Advised they can call tomorrow and ask for a callback if any cancellations occur.    Will fwd note to [facility] nursing staff and send email to . Since patient is high-risk and both patient and family are working hard to admit for treatment, will attempt to expedite admission wherever possible. I did inform his mom that we cannot guarantee any timeline for this and she indicated understanding.    Provided resources for community detox centers, including [facility], [facility], and [facility].    1 week zubsolv bridge sent to local pharmacy.    Christopher Paez MD

## 2021-06-01 NOTE — DISCHARGE INSTRUCTIONS
You have been seen in the emergency department today to try to get into detox.  There are no detox beds available.  Additionally, the detox unit does not take admissions for opiate detox and instead advises that you follow-up with the community addiction medicine providers.  You have used opiates too recently for us to treat you with Suboxone.  We strongly recommend that you follow-up with your addiction medicine clinic as they can work with you to get you back onto medications to help with your opiate addiction.  They can also discuss treatment options with you.    If your goal is to be off all medications, we strongly recommend that you work with the addiction medicine providers as they can work to taper you off of medications at the appropriate time.    Rule 25 Information -     To access chemical dependency treatment you must have an assessment done or updated within 30 days of starting any program. If you are intoxicated to may be required to detox at a detox facility before starting treatment.     [facility]: [phone]  [facility]: [phone]  [facility]: [phone]  [facility]: [phone]   [facility]: [phone]    The following facilities offer Rule 25 assessments -     [facility]  [phone]  [facility]   [address] [place] [address]    [facility] ([facility])  [phone]  [address] [place] [address]  *Walk in assessments also available M-F starting at 8 am.     [facility]  [phone] [address] [facility] [address]      [facility]   [phone]  [facility]  [address] [facility] [address]    [facility]   [phone]  [address] [place] [facility] [address]    [facility]  [phone] - [phone] 1  Multiple clinic locations    [facility]   [phone]  [facility]  E  [facility] [address]    [facility]  [phone]  [address] [facility] [address]    [facility]  [phone]  [address] [facility] [address]    [facility] [address]  -   [place] - [phone]  [place] - [phone]  [facility] [phone]  [facility] [phone]  [facility] [phone]  [place] - [phone]  [place] - [phone]  [place] - [phone]

## 2021-06-01 NOTE — ED PROVIDER NOTES
"ED Provider Note  [facility]      History     Chief Complaint   Patient presents with     Addiction Problem     seeking detox from etoh and IV heroin, last use of both in last 2 hours.     HPI  Mati Costa is a 21 year old male who presents to the emergency department today seeking admission to detox.  Patient reports that he is drinking alcohol approximately once a week.  He admits to drinking vodka but is unclear how much he is drinking.  He estimates \"maybe 3 or 4 drinks\" 1 time per week.  Last drank a couple of hours prior to arrival in the ED.  Denies any history of alcohol withdrawal seizures or DTs.  He admits to going through treatment for alcohol abuse a few years ago at [facility].    He also admits to using heroin.  He states he is shooting heroin approximately 1 g daily for the past 3 years.  Last use was approximately 2 hours prior to arrival in the ED.  He had seen the addiction medicine clinic here at [facility] in the middle of April.  According to that note he expressed interest on starting on Sublocade.  He now states he has no longer interested in getting on this medication and instead just wants to get into a treatment program.    Patient admits to anxiety and depression.  He states he is treated for this and is taking his meds.  He denies any suicidal ideation at this time.  He denies any auditory hallucinations.    Patient denies any fevers, chills, rhinorrhea, sore throat, cough, shortness of breath, chest pain, abdominal pain, nausea, vomiting, or diarrhea.  He has not had his Covid vaccine.    Past Medical History:   Diagnosis Date     Alcohol abuse      Anxiety      Depressive disorder      Insomnia      Opiate dependence (H)      Substance abuse (H)        Past Surgical History:   Procedure Laterality Date     ENT SURGERY      tubes as child       No family history on file.    Social History     Tobacco Use     Smoking status: Current Every Day Smoker     " Packs/day: 0.00     Smokeless tobacco: Never Used     Tobacco comment: vaping   Substance Use Topics     Alcohol use: Yes     Comment: social         Past Medical History  Past Medical History:   Diagnosis Date     Alcohol abuse      Anxiety      Depressive disorder      Insomnia      Opiate dependence (H)      Substance abuse (H)      Past Surgical History:   Procedure Laterality Date     ENT SURGERY      tubes as child     naloxone (NARCAN) 4 MG/0.1ML nasal spray  Buprenorphine HCl-Naloxone HCl (ZUBSOLV) 8.6-2.1 MG SUBL  citalopram (CELEXA) 20 MG tablet  ibuprofen (ADVIL/MOTRIN) 200 MG tablet  naloxone (NARCAN) 4 MG/0.1ML nasal spray  traZODone (DESYREL) 100 MG tablet      No Known Allergies  Family History  No family history on file.  Social History   Social History     Tobacco Use     Smoking status: Current Every Day Smoker     Packs/day: 0.00     Smokeless tobacco: Never Used     Tobacco comment: vaping   Substance Use Topics     Alcohol use: Yes     Comment: social     Drug use: Not Currently     Types: Opiates     Comment: LSD; heroin      Past medical history, past surgical history, medications, allergies, family history, and social history were reviewed with the patient. No additional pertinent items.       Review of Systems   Constitutional: Negative for chills and fever.   HENT: Negative for congestion and sore throat.    Eyes: Negative for redness.   Respiratory: Negative for cough and shortness of breath.    Cardiovascular: Negative for chest pain and palpitations.   Gastrointestinal: Negative for abdominal pain, diarrhea, nausea and vomiting.   Musculoskeletal: Negative for arthralgias.   Skin: Negative for color change.   Neurological: Negative for headaches.   Psychiatric/Behavioral: Negative for confusion and suicidal ideas.   All other systems reviewed and are negative.    A complete review of systems was performed with pertinent positives and negatives noted in the HPI, and all other systems  negative.    Physical Exam   BP: 106/58  Pulse: 72  Temp: 97.2  F (36.2  C)  Weight: 58.1 kg (128 lb)  SpO2: 99 %  Physical Exam  Vitals signs and nursing note reviewed.   Constitutional:       Appearance: He is well-developed and underweight.      Comments: Thin adult male, lying back in bed.  Eyes closed.  Arouses to voice.   HENT:      Head: Normocephalic.   Eyes:      Pupils: Pupils are equal, round, and reactive to light.   Neck:      Musculoskeletal: Neck supple.   Cardiovascular:      Rate and Rhythm: Normal rate and regular rhythm.      Heart sounds: Normal heart sounds. No murmur. No gallop.    Pulmonary:      Effort: Pulmonary effort is normal. No respiratory distress.      Breath sounds: Normal breath sounds. No wheezing or rales.   Abdominal:      General: Bowel sounds are normal. There is no distension.      Palpations: Abdomen is soft.      Tenderness: There is no abdominal tenderness. There is no guarding or rebound.   Skin:     General: Skin is warm and dry.      Comments: Track marks noted both forearms and ACs.  No sign of erythema or abscess at this time.   Neurological:      Mental Status: He is alert.   Psychiatric:      Comments: Somewhat irritable.  No SI.  No HI, no psychosis.  Insight moderate.  Not agitated or aggressive.         ED Course      Procedures        The medical record was reviewed and interpreted.       Results for orders placed or performed during the hospital encounter of 05/31/21   Alcohol breath test POCT     Status: Abnormal   Result Value Ref Range    Alcohol Breath Test 0.071 (A) 0.00 - 0.01     Medications - No data to display     Assessments & Plan (with Medical Decision Making)   Patient presents to the ED today due to trying to get into detox.  He is use of alcohol appears to be perhaps once per week.  According to the addiction medicine clinic visit from April 14, he admitted that he generally does not have problem with alcohol and uses it to help him qualify for a  "stay at detox.  Even if he is using once per week this would not qualify him for detox admission at this time.  Breathalyzer here in the ED today is 0.071.    With regard to opiate use, he has seen the addiction medicine clinic and has previously been given prescriptions for Suboxone.  It appears that he has had some issues tolerating Suboxone due to side effects and therefore was trying to get on Sublocade.  He has not followed up with the clinic since.  He has used approximately 2 hours prior to arrival so he does not qualify for treatment for opiate withdrawal in the ED at this time.    When I discussed this with the patient, he seems frustrated as he was [facility].  He states that he wants to be off all medications including Suboxone when he starts [facility].  In order to do that he first has to go through detox.  We do not admit to detox for opiate withdrawal, and instead recommend community follow-up.  He has already seen the addiction medicine clinic and is already established as a patient there.  I again strongly encouraged him to follow-up with them and with time they would be able to work with him and get him off these medications and into a treatment program such as [facility].  Patient says \"I am just going to go shoot more dope now\".  He states he has Narcan at home but I will provide him with a prescription nonetheless. He needs to see the Addiction Medicine Clinic to discuss short term goals (getting off heroin) and more medium term goals (completely getting off all MAT and getting back into a treatment facility).     I have reviewed the nursing notes. I have reviewed the findings, diagnosis, plan and need for follow up with the patient.    New Prescriptions    NALOXONE (NARCAN) 4 MG/0.1ML NASAL SPRAY    Spray 1 spray (4 mg) into one nostril alternating nostrils as needed for opioid reversal every 2-3 minutes until assistance arrives       Final diagnoses:   Opiate abuse, " continuous (H)   Alcohol use       --  Hailey Lucas MD  [facility] EMERGENCY DEPARTMENT  5/31/2021     Hailey Lucas MD  05/31/21 2027

## 2021-06-01 NOTE — ED TRIAGE NOTES
"Seeking detox for ETOH, last drink was about an hour ago. Pt drink about \"few drinks\" of vodka per day.   "

## 2021-06-02 ENCOUNTER — TELEPHONE (OUTPATIENT)
Dept: BEHAVIORAL HEALTH | Facility: CLINIC | Age: 21
End: 2021-06-02

## 2021-06-02 ENCOUNTER — MYC MEDICAL ADVICE (OUTPATIENT)
Dept: ADDICTION MEDICINE | Facility: CLINIC | Age: 21
End: 2021-06-02

## 2021-06-02 NOTE — TELEPHONE ENCOUNTER
[facility] scheduling number sent to patient via [employer], [phone].    Tasia Vasques RN on 6/2/2021 at 11:49 AM

## 2021-06-03 ENCOUNTER — HOSPITAL ENCOUNTER (OUTPATIENT)
Dept: BEHAVIORAL HEALTH | Facility: CLINIC | Age: 21
Discharge: HOME | End: 2021-06-03
Attending: FAMILY MEDICINE | Admitting: FAMILY MEDICINE
Payer: COMMERCIAL

## 2021-06-03 VITALS — HEIGHT: 72 IN | BODY MASS INDEX: 17.61 KG/M2 | WEIGHT: 130 LBS

## 2021-06-03 PROCEDURE — H0001 ALCOHOL AND/OR DRUG ASSESS: HCPCS | Mod: 95

## 2021-06-03 ASSESSMENT — COLUMBIA-SUICIDE SEVERITY RATING SCALE - C-SSRS
TOTAL  NUMBER OF ABORTED OR SELF INTERRUPTED ATTEMPTS PAST 3 MONTHS: NO
TOTAL  NUMBER OF ABORTED OR SELF INTERRUPTED ATTEMPTS PAST LIFETIME: NO
3. HAVE YOU BEEN THINKING ABOUT HOW YOU MIGHT KILL YOURSELF?: YES
4. HAVE YOU HAD THESE THOUGHTS AND HAD SOME INTENTION OF ACTING ON THEM?: YES
5. HAVE YOU STARTED TO WORK OUT OR WORKED OUT THE DETAILS OF HOW TO KILL YOURSELF? DO YOU INTEND TO CARRY OUT THIS PLAN?: YES
1. IN THE PAST MONTH, HAVE YOU WISHED YOU WERE DEAD OR WISHED YOU COULD GO TO SLEEP AND NOT WAKE UP?: YES
TOTAL  NUMBER OF INTERRUPTED ATTEMPTS PAST 3 MONTHS: NO
TOTAL  NUMBER OF INTERRUPTED ATTEMPTS LIFETIME: NO
ATTEMPT LIFETIME: NO
1. IN THE PAST MONTH, HAVE YOU WISHED YOU WERE DEAD OR WISHED YOU COULD GO TO SLEEP AND NOT WAKE UP?: YES
6. HAVE YOU EVER DONE ANYTHING, STARTED TO DO ANYTHING, OR PREPARED TO DO ANYTHING TO END YOUR LIFE?: NO
2. HAVE YOU ACTUALLY HAD ANY THOUGHTS OF KILLING YOURSELF?: NO
4. HAVE YOU HAD THESE THOUGHTS AND HAD SOME INTENTION OF ACTING ON THEM?: NO
ATTEMPT PAST THREE MONTHS: NO
2. HAVE YOU ACTUALLY HAD ANY THOUGHTS OF KILLING YOURSELF LIFETIME?: YES
5. HAVE YOU STARTED TO WORK OUT OR WORKED OUT THE DETAILS OF HOW TO KILL YOURSELF? DO YOU INTEND TO CARRY OUT THIS PLAN?: NO
REASONS FOR IDEATION LIFETIME: MOSTLY TO END OR STOP THE PAIN (YOU COULDN'T GO ON LIVING WITH THE PAIN OR HOW YOU WERE FEELING)
6. HAVE YOU EVER DONE ANYTHING, STARTED TO DO ANYTHING, OR PREPARED TO DO ANYTHING TO END YOUR LIFE?: NO

## 2021-06-03 ASSESSMENT — ANXIETY QUESTIONNAIRES
7. FEELING AFRAID AS IF SOMETHING AWFUL MIGHT HAPPEN: NOT AT ALL
GAD7 TOTAL SCORE: 4
6. BECOMING EASILY ANNOYED OR IRRITABLE: MORE THAN HALF THE DAYS
GAD7 TOTAL SCORE: 4
2. NOT BEING ABLE TO STOP OR CONTROL WORRYING: NOT AT ALL
GAD7 TOTAL SCORE: 4
7. FEELING AFRAID AS IF SOMETHING AWFUL MIGHT HAPPEN: NOT AT ALL
5. BEING SO RESTLESS THAT IT IS HARD TO SIT STILL: NOT AT ALL
3. WORRYING TOO MUCH ABOUT DIFFERENT THINGS: SEVERAL DAYS
1. FEELING NERVOUS, ANXIOUS, OR ON EDGE: SEVERAL DAYS
4. TROUBLE RELAXING: NOT AT ALL

## 2021-06-03 ASSESSMENT — PAIN SCALES - GENERAL: PAINLEVEL: NO PAIN (0)

## 2021-06-03 ASSESSMENT — PATIENT HEALTH QUESTIONNAIRE - PHQ9
SUM OF ALL RESPONSES TO PHQ QUESTIONS 1-9: 10
10. IF YOU CHECKED OFF ANY PROBLEMS, HOW DIFFICULT HAVE THESE PROBLEMS MADE IT FOR YOU TO DO YOUR WORK, TAKE CARE OF THINGS AT HOME, OR GET ALONG WITH OTHER PEOPLE: SOMEWHAT DIFFICULT
SUM OF ALL RESPONSES TO PHQ QUESTIONS 1-9: 10

## 2021-06-03 ASSESSMENT — MIFFLIN-ST. JEOR: SCORE: 1632.68

## 2021-06-03 NOTE — PROGRESS NOTES
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 DSM 5 Code)     Alcohol Use Disorder The patient does not currently meet the criteria for an Alcohol use disorder, but has a history of abuse.   Cannabis Use Disorder The patient does not meet the criteria for a Cannabis use disorder.   Hallucinogen Use Disorder The patient does not meet the criteria for a Hallucinogen use disorder.   Inhalant Use Disorder The patient does not currently meet the criteria for an Inhalant use disorder, but has a history of abuse.   Opioid Use Disorder Severe   (F11.20) (304.00)   Sedative, Hypnotic, or Anxiolytic Use Disorder The patient does not meet the criteria for a Sedative/Hypnotic use disorder.   Stimulant Related Disorder The patient does not currently meet the criteria for a Stimulant use disorder, but has a history of abuse.   Tobacco Use Disorder Moderate   (F17.200) (305.1)   Other (or unknown) Substance Use Disorder The patient does not meet the criteria for a Other (or unknown) Substance use disorder.     collateral Contac  Specify if: In early remission:  After full criteria for alcohol/drug use disorder were previously met, none of the criteria for alcohol/drug use disorder have been met for at least 3 months but for less than 12 months (with the exception that  "Criterion A4,  Craving or a strong desire or urge to use alcohol/drug  may be met).     In sustained remission:   After full criteria for alcohol use disorder were previously met, non of the criteria for alcohol/drug use disorder have been met at any time during a period of 12 months or longer (with the exception that Criterion A4,  Craving or strong desire or urge to use alcohol/drug  may be met).   Specify if:   This additional specifier is used if the individual is in an environment where access to alcohol is restricted.    Mild: Presence of 2-3 symptoms  Moderate: Presence of 4-5 symptoms  Severe: Presence of 6 or more symptoms      Recommendations:     1. Plan for Safety and Risk Management:  Recommended that patient call 911 or go to the local ED should there be a change in any of these risk factors..      Report to child / adult protection services was NA.     2. TIM Referrals:   Recommendations:      Co-occurring TIM residential or IOP with lodging .      Patient reports they are willing to follow these recommendations and have requested [facility] [place].  Patient would like the following family or other support people involved in their treatment:  \"parents and brother\". Patient has a history of opiate use and was give treatment options, including Medication Assisted Treatment, and information on the risks of opiod use disorder including recognizing and responding to opiod overdose.  Currently has RX Subsolve.    3. Mental Health Referrals:  TBD in treatment.    4. Patient's identified no special considerations needed for tx..     5. Recommendations for treatment focus:   Depressed Mood - DX  Anxiety - DX  Alcohol / Substance Use - Opioid UseDisorder-severe and other substances abused..      Provider Name/ Credentials:  VENECIA Hernandez  Loren 3, 2021                "

## 2021-06-04 ENCOUNTER — HOSPITAL ENCOUNTER (OUTPATIENT)
Dept: BEHAVIORAL HEALTH | Facility: CLINIC | Age: 21
End: 2021-06-04
Attending: FAMILY MEDICINE
Payer: COMMERCIAL

## 2021-06-04 VITALS — TEMPERATURE: 97.8 F | OXYGEN SATURATION: 97 %

## 2021-06-04 PROBLEM — F19.20 CHEMICAL DEPENDENCY (H): Status: ACTIVE | Noted: 2021-06-04

## 2021-06-04 LAB
LABORATORY COMMENT REPORT: NORMAL
SARS-COV-2 RNA RESP QL NAA+PROBE: NEGATIVE
SPECIMEN SOURCE: NORMAL

## 2021-06-04 PROCEDURE — 1002N00001 HC LODGING PLUS FACILITY CHARGE ADULT

## 2021-06-04 PROCEDURE — 87635 SARS-COV-2 COVID-19 AMP PRB: CPT | Performed by: FAMILY MEDICINE

## 2021-06-04 RX ORDER — MAGNESIUM HYDROXIDE/ALUMINUM HYDROXICE/SIMETHICONE 120; 1200; 1200 MG/30ML; MG/30ML; MG/30ML
30 SUSPENSION ORAL EVERY 6 HOURS PRN
COMMUNITY
End: 2021-06-24

## 2021-06-04 RX ORDER — ACETAMINOPHEN 325 MG/1
325-650 TABLET ORAL EVERY 4 HOURS PRN
COMMUNITY
End: 2021-06-24

## 2021-06-04 RX ORDER — IBUPROFEN 200 MG
400 TABLET ORAL EVERY 6 HOURS PRN
COMMUNITY
End: 2021-06-24

## 2021-06-04 RX ORDER — LORATADINE 10 MG/1
10 TABLET ORAL DAILY
COMMUNITY
End: 2021-06-24

## 2021-06-04 RX ORDER — LANOLIN ALCOHOL/MO/W.PET/CERES
3 CREAM (GRAM) TOPICAL
COMMUNITY
End: 2021-06-24

## 2021-06-04 RX ORDER — AMOXICILLIN 250 MG
2 CAPSULE ORAL DAILY PRN
COMMUNITY
End: 2021-06-24

## 2021-06-04 ASSESSMENT — ANXIETY QUESTIONNAIRES
GAD7 TOTAL SCORE: 4
3. WORRYING TOO MUCH ABOUT DIFFERENT THINGS: MORE THAN HALF THE DAYS
1. FEELING NERVOUS, ANXIOUS, OR ON EDGE: SEVERAL DAYS
7. FEELING AFRAID AS IF SOMETHING AWFUL MIGHT HAPPEN: NOT AT ALL
GAD7 TOTAL SCORE: 7
5. BEING SO RESTLESS THAT IT IS HARD TO SIT STILL: NOT AT ALL
IF YOU CHECKED OFF ANY PROBLEMS ON THIS QUESTIONNAIRE, HOW DIFFICULT HAVE THESE PROBLEMS MADE IT FOR YOU TO DO YOUR WORK, TAKE CARE OF THINGS AT HOME, OR GET ALONG WITH OTHER PEOPLE: SOMEWHAT DIFFICULT
4. TROUBLE RELAXING: SEVERAL DAYS
6. BECOMING EASILY ANNOYED OR IRRITABLE: MORE THAN HALF THE DAYS
2. NOT BEING ABLE TO STOP OR CONTROL WORRYING: SEVERAL DAYS

## 2021-06-04 ASSESSMENT — PATIENT HEALTH QUESTIONNAIRE - PHQ9
SUM OF ALL RESPONSES TO PHQ QUESTIONS 1-9: 6
SUM OF ALL RESPONSES TO PHQ QUESTIONS 1-9: 10

## 2021-06-04 NOTE — PROGRESS NOTES
Progress Note    This patient had a Comprehensive Substance Abuse assessment on 06/03/2021 completed by VENECIA Hernandez.  This patient was seen for a face to face update of the Comprehensive Substance Abuse assessment on 6/4/2021 by VENECIA Lance.  INSIDE: The patient's Comprehensive Substance Abuse assessment completed on 06/03/2021 is in the patient's electronic medical record in Epic in the Chart Review section under the Notes/Trans Tab.    Alcohol/Drug use since the last CD evaluation (include date of last use):     Pt's last reported use of heroin was 6/3/21 in the am. Pt is prescribed Suboxone.     Please note any other clinical changes since the last CD evaluation (such as medication changes, additional legal charges, detoxification admissions, overdoses, etc.)     No significant changes since the last CD evaluation       ASAM Dimensions Original scores Current Scores   I.) Intoxication and Withdrawal: 2 1   II.) Biomedical:  0 0   III.) Emotional and Behavioral:  2 2   IV.) Readiness to Change:  2 2   V.) Relapse Potential: 4 4   VI.) Recovery Environmental: 2 2     Please list clinical justifications for the above ASAM score changes since the original comprehensive assessment:     The patient's score on Dimension I changed from a 2 to a 1.  The patient had not consumed any alcohol or drugs since 6/3/2021 and he does not appear to have any current risk of having significant withdrawal symptoms.        Current COLE: Current UA:     0.000     Positive for MDMA and Heroin/Morphine and negative for all other screened drugs.       PHQ-9, CARIDAD-7   PHQ-9 on 6/4/2021 CARIDAD-7 on 6/4/2021   The patient's PHQ-9 score was 6 out of 27, indicating mild depression.   The patient's CARIDAD-7 score was 7 out of 21, indicating mild anxiety.       Smyth-Suicide Severity Rating Scale Reassessment   Have you ever wished you were dead or that you could go to sleep and not wake up?  Past Month:  No     Have you actually  had any thoughts of killing yourself?  Past Month:  No     Have you been thinking about how you might do this?     Past Month:  No   Lifetime:  Yes, Describe: overdose   Have you had these thoughts and had some intention of acting on them?     Past Month:  No   Lifetime:  No   Have you started to work out the details of how to kill yourself?   Past Month:  No   Lifetime:  No   Do you intend to carry out this plan?   No     When you have the thoughts how long do they last?  The patient denied having any suicidal thoughts within the past month.     Are there things - anyone or anything (i.e. family, Synagogue, pain of death) that stopped you from wanting to die or acting on thoughts of suicide?  Protective factors definitely stopped you from attempting suicide       2008  [employer]  Used with permission by Amara Godoy, PhD.  n       Guide to C-SSRS Risk Ratings   NO IDEATION:  with no active thoughts IDEATION: with a wish to die. IDEATION: with active thoughts. Risk Ratings   If Yes No No 0 - Very Low Risk   If NA Yes No 1 - Low Risk   If NA Yes Yes 2 - Low/moderate risk   IDEATION: associated thoughts of methods without intent or plan INTENT: Intent to follow through on suicide PLAN: Plan to follow through on suicide Risk Ratings cont...   If Yes No No 3 - Moderate Risk   If Yes Yes No 4 - High Risk   If Yes Yes Yes 5 - High Risk   The patient's ADDITIONAL RISK FACTORS and lack of PROTECTIVE FACTORS may increase their overall suicide risk ratings.     Additional Risk Factors:    Significant history of having untreated or poorly treated mental health symptoms     Tendency to be socially isolated and/or cut off from the support of others   Protective Factors:    Having people in his/her life that would prevent the patient from considering a suicide attempt (i.e. young children, spouse, parents, etc.)     Having pet(s) that give companionship and/or would prevent the patient from  "considering a suicide attempt     An absence of chronic health problems or stable and well treated chronic health issues     A positive relationship with his/her clinical medical and/or mental health providers     Having easy access to supportive family members     Having a good community support network     Having restricted access to highly lethal means of suicide     Risk Status   0. - Very Low Risk:  Evaluation Counselors:  Document in Epic / SBAR to counselor \"Very Low Risk\".      Treatment Counselors:  Reassess upon admission as applicable, assess weekly in progress notes under Dimension 3 and summarize in Discharge / Treatment summary under Dimension 3.     Additional information to support suicide risk rating: There was no additional information to provide at this time.       "
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
## 2021-06-04 NOTE — PROGRESS NOTES
Name: Mati Costa  Date: 6/4/2021  Medical Record: [id number]  Envelope Number: [id number]  List of Contents (List each item separately in new row):   One Cell Phone,  One Samsung fast charge .     Admission:  I am responsible for any personal items that are not sent to the safe or pharmacy.  [facility] is not responsible for loss, theft or damage of any property in my possession.    Patient Signature:  ___________________________________________       Date/Time:__________________________    Staff Signature: __________________________________       Date/Time:__________________________    2nd Staff person, if patient is unable/unwilling to sign:      __________________________________________________________       Date/Time: __________________________    Discharge:  [facility] has returned all of my personal belongings:    Patient Signature: ________________________________________     Date/Time: ____________________________________    Staff Signature: ______________________________________     Date/Time:_____________________________________

## 2021-06-04 NOTE — PROGRESS NOTES
Name: Mati Costa  Date: 6/4/2021  Medical Record: [id number]  Envelope Number: [id number]  List of Contents (List each item separately in new row):   Trazodone 50mg Tablet.   Admission:  I am responsible for any personal items that are not sent to the safe or pharmacy.  [facility] is not responsible for loss, theft or damage of any property in my possession.    Patient Signature:  ___________________________________________       Date/Time:__________________________    Staff Signature: __________________________________       Date/Time:__________________________    2nd Staff person, if patient is unable/unwilling to sign:      __________________________________________________________       Date/Time: __________________________    Discharge:  [facility] has returned all of my personal belongings:    Patient Signature: ________________________________________     Date/Time: ____________________________________    Staff Signature: ______________________________________     Date/Time:_____________________________________

## 2021-06-04 NOTE — PROGRESS NOTES
"[facility] Nursing Health Assessment      Vital signs:     98.2-16-92/56-76   Sat 97%@RA    Direct admission    Counselor: Remberto/Anirudh  Drug of Choice: Heroine, ETOH,  Last use: 6/3/21 Yesterday Heroine; ETOH 2 days ago  Home clinic/MD: Dr Paez Addiction medicine  Psychiatrist/therapist: No    Medical history/current conditions:  None    H&P Screen:  H&P within the last 90 days: Yes.  Date: 5/03/2021 Location: Addiction medicine-Dr Wei      Mental Health diagnosis: Depression, TIM  Medication compliant?: Yes  Recent sucidal thoughts? No     When? n/a  Current thought of self-harm? no    Plan? n/a    Pain assessment:   Pt. Experiencing pain at this time?  No      Nursing Assessment Summary:  Pt stable and appropriuate for LP programming    On-going nursing intervention required?   No    Acute care visit recommended: no   [facility] Community Medical Screen for COVID19    In the last 2 weeks have you been in an large group gatherings (more than 10 people)? No    Have you been covering your nose and mouth while out in the community? Yes    Have you come in contact with anyone in the last month who \"was suspected of\" or \"tested positive\" for COVID-19? NO    Have you received COVID Vaccination? No    Have you tested positive for COVID-19 in the past 90 days? No  -If yes pt should not be re-tested until 90 days from day one of symptom onset   UNLESS patient is COVID symptomatic, contact infection prevention for recommendation    \"Do you\" or \"have you\" had....any of the following symptoms in the last 2 weeks? No      Fever or chills     Cough     Shortness of breath or difficulty breathing     New muscle or body aches    New headache     New loss of taste or smell    Sore throat     Congestion or runny nose     New and unexplained Nausea or vomiting     Diarrhea    New and unexplained fatigue    Does the above COVID screen need to be reviewed by Infection Prevention? No    COVID TEST COMPLETED BY CHARISN ? Saurabh ARCINIEGA RN    COVID-19 " - Pt informed of the following while at LP:    1)Staff will take temperature and O2Sats once daily    2) Practice good hand washing hygiene and avoid touching face    3) If pt has any of the symptoms below, notify staff immediately.      Fever     Cough     Shortness of breath or difficulty breathing     Chills     Repeated shaking with chills    Muscle pain     4) COVID testing maybe initiated at admission. Depending on the situation amd symptoms patients may be tested more than once during their stay.  Patient will be required to stay in room until lab results confirm negative. If you are unable to stay in your room you may be asked to leave the program.  If COVID results are positive, You will have to exit the program quarantine as recommended per CDC and then may return for CD treatment after symptoms have resolved    5) Per COVID protocol, during your stay at , social distancing is required AND mouth and nose must be covered at all times with facial mask while out in milieu.      6) Patients will not be allowed to go to any outside appointments, all outside appointments will need to be virtual or by phone    RN Assessment of Patient's Ability to Safely Manage and Self-Administer Respiratory Treatments    Has experience in the management of Respiratory (If NA, indicate and move to Integrative Therapies): NO    Including knowledge and understanding of the importance of:    Does pt have any of the following Respiratory Illness/disorders?   Asthma, COPD, RAD, Bronchitis, Emphysema, Cystic fibrosis, SAKINA No    Which Acute or Chronic Respiratory Illness do you have which requires intervention? n/a   Did pt bring all prescribed respiratory supplies? CPAP, BiPap, Nebulizer, Nebulizer solution, scheduled inhaler, Rescue Inhaler  No   Pt understands they must carry  Rescue Inhalers  at all times N/a   Alerting staff with respiratory symptoms?  Wheezing, SOB, Tightness or pain in chest, Excessive Daytime Sleepiness Yes      Does the patient have the physical and mental ability to:     Perform respiratory cares? CPAP, BiPap, Nebulizer tx, scheduled inhaler, Rescue Inhaler tx, respiratory medicines yes   Determine when and how often to use respiratory treatments? (CPAP, BiPap, Nebulizer tx, scheduled inhaler, Rescue Inhaler tx, respiratory medicines Yes   Nebulizer/CPAP/BiPAP accessories Yes   CPAP/BiPAP Therapy settings n/a     Therefore does the patient, present a risk of harm to themselves or other clients in the facility if allowed to self-administer Respiratory treatments.  Consider factors above.  No    I have assessed the patient to be able to safely administer respiratory treatments.  Yes    Integrative Therapies: Essential Oils    Patient requesting essential oil inhaler to manage (Mood/Mental Health/Physical/Spiritual symptoms).     Discussed appropriate use of essential oil inhalers and instructed patient not to leave labeled product out on unit.     Patient was screened for kidney disease, asthma/reactive airway disease and rashes and wounds or 1st trimester of pregnancy    List Essential Oils requested by pt Not interested    Patient verbalized and demonstrated understanding of how to use essential oil inhaler correctly and will notify LP RN with any concerns or side effects. Patient agrees not to share their essential oil inhaler with other clients.  Continue to support the patient in safely utilizing integrative therapies as able to manage symptoms during treatment.       Patient tobacco use:    Do you use tobacco? Yes   Type? ciggaretes and vape  How often? daily  How much? 1 PPD   Are you interested in quitting? Thinking about it    NRT (Nicotine Replacement therapy) ordered? no   Pt is aware of the dangers of tobacco cessation and in contemplation.    Pt given written education.          Nutritional Assessment:    Have you ever purged, binged or restricted yourself as a way to control your weight?   No     Are you  on a special diet?   No     Do you have any concerns regarding your nutritional status?   No     Have you had any appetite changes in the last 3 months?   No   Have you had weight loss or weight gain of more than 10 lbs in the last 3 months?   If patient gained or lost more than 10 lbs, then refer to program RN / attending Physician for assessment.   No   Was the patient informed of BMI?    Normal, No Intervention   Yes   Have you engaged in any risk-taking behavior that would put you at risk for exposure to blood-borne or sexually transmitted diseases?   No   Do you have any dental problems?   No

## 2021-06-04 NOTE — PROGRESS NOTES
[facility]  [address] [place] [address] [address] and [address] [place] [address]        ADULT CD ASSESSMENT ADDENDUM      Patient Name: Mati Costa  Cell Phone:   Home: [phone] (home)    Mobile:   Telephone Information:   Mobile [phone]       Email:  [email or web address]  Emergency Contact: Ze Costa- father   Tel: [phone]    The patient reported being:  Single, no serious involvement    With which race do you identify? White    Initial Screening Questions     1. Are you currently having severe withdrawal symptoms that are putting yourself or others in danger?  No    2. Are you currently having severe medical problems that require immediate attention?  No    3. Are you currently having severe emotional or behavioral problems that are putting yourself or others at risk of harm?  No    4. Do you currently participate in community caryl activities, such as attending Shinto, temple, Shinto or Latter-day services?  The patient denied currently being involved in any community caryl activities.    5. How does your spirituality impact your recovery?  It helps    6. Do you currently self-administer your medications?  Yes    Do you have a valid 's license?    Yes       Mental Health Status   Physical Appearance/Attire: Appears stated age   Hygiene: well groomed   Eye Contact: at examiner   Speech Rate:  regular   Speech Volume: regular   Speech Quality: fluid   Cognitive/Perceptual:  reality based   Cognition: memory intact    Judgment: intact and able to concentrate   Insight: intact and able to concentrate   Orientation:  time, place, person and situation   Thought: logical    Hallucinations:  none   General Behavioral Tone: cooperative   Psychomotor Activity: no problem noted   Gait:  no problem   Mood: appropriate   Affect: congruence/appropriate   Counselor Notes: NA     Criteria for Diagnosis: DSM-5 Criteria for Substance Use Disorders      Alcohol Use  Disorder Moderate - 303.90 (F10.20)  Opioid Use Disorder Severe - 304.00 (F11.20)  Tobacco Use Disorder Moderate - 305.10 (F17.200)    Level of Care   I.) Intoxication and Withdrawal: 1   II.) Biomedical:  0   III.) Emotional and Behavioral:  2   IV.) Readiness to Change:  2   V.) Relapse Potential: 4   VI.) Recovery Environmental: 2     Initial Problem List     The patient is currently living in an unhealthy and/or using environment  The patient lacks relapse prevention skills  The patient has poor coping skills  The patient has poor refusal skills   The patient lacks a sober peer support network  The patient has a tendency to isolate  The patient has dual issues of MI and CD  The patient lacks the ability to effectively manage his/her mental health issues    Patient/Client is willing to follow treatment recommendations.  Yes    Counselor: VENECIA Lance

## 2021-06-04 NOTE — PROGRESS NOTES
Initial Services Plan        Service Initiation Date: 6/4/2021    Immediate health and/or safety concerns: No    Identify health and safety concern(s) below and include plan to address:    None Identified    Treatment suggestions for client during the time between intake (admit date) and completion of the individual treatment plan:     Look for a sober support network, i.e. 12 step, [facility], [facility], etc  Tour the treatment center or outpatient clinic  Introduce yourself to your treatment group. Spend time getting to know your peers  Review your patient or client handbook  Begin working on your treatment goal list    Completed by: VENECIA Lance  Date completed: 6/4/2021 at 12:59 PM

## 2021-06-05 ENCOUNTER — TELEPHONE (OUTPATIENT)
Dept: BEHAVIORAL HEALTH | Facility: CLINIC | Age: 21
End: 2021-06-05

## 2021-06-05 ENCOUNTER — HOSPITAL ENCOUNTER (OUTPATIENT)
Dept: BEHAVIORAL HEALTH | Facility: CLINIC | Age: 21
End: 2021-06-05
Attending: FAMILY MEDICINE
Payer: COMMERCIAL

## 2021-06-05 VITALS — OXYGEN SATURATION: 98 % | TEMPERATURE: 98.1 F

## 2021-06-05 PROCEDURE — H2035 A/D TX PROGRAM, PER HOUR: HCPCS | Mod: HQ

## 2021-06-05 PROCEDURE — 1002N00001 HC LODGING PLUS FACILITY CHARGE ADULT

## 2021-06-05 ASSESSMENT — ANXIETY QUESTIONNAIRES: GAD7 TOTAL SCORE: 7

## 2021-06-05 NOTE — GROUP NOTE
Group Therapy Documentation    PATIENT'S NAME: Mati Costa  MRN:   [id number]  :   2000  ACCT. NUMBER: [id number]  DATE OF SERVICE: 21  START TIME:  8:30 AM  END TIME: 10:30 AM  FACILITATOR(S): Noe Saxena LADC; Jaswinder Asher LADC  TOPIC: BEH Group Therapy  Number of patients attending the group:  28  Group Length:  2 Hours    Group Therapy Type: Recovery strategies    Summary of Group / Topics Discussed:    Recovery Principles and Relapse prevention      Group Attendance:  Attended group session    Patient's response to the group topic/interactions:  cooperative with task    Patient appeared to be Actively participating.        Client specific details:  Group topic was relapse prevention, attitudes and behaviors that contribute to relapse, and recovery principles to address these issues. Pt was receptive to group topic and gave and received feedback appropriately.

## 2021-06-05 NOTE — TELEPHONE ENCOUNTER
Pt coming to writer with complaints of opiate withdrawal. Loss of appetite, cold sweats, irritability.  Pt states that he wants to leave and use.    Pt reported last use of IV Heroin was Thursday evening (6/3/21)    Pt dosed first strip of Suboxone this AM (6/5/21)    Pt asking for other comfort meds to aid with withdrawal symptoms.  Writer reaching out to Dr Paez and awaiting reply

## 2021-06-05 NOTE — TELEPHONE ENCOUNTER
Dr Paez,    You gave VO for pt to take an extra dose of Subzolv (8.6-2.1mg) as he was experiencing opiate withdrawal symptoms.  (The pt had initially reported to me that he took his dosing of Subzolv this AM)    When I met the pt at the med room it was discovered that the pt's last Subzolv dosing was yesterday, 6/4 at 2123.   I made the pt aware of this.  The pt will not receive the 2nd dose for today.   Pt dosed his first Subzolve at 1349 on 6/5/21.   I encouraged the pt to get on a routine and he agreed.    Pt went back to programming

## 2021-06-05 NOTE — GROUP NOTE
Group Therapy Documentation    PATIENT'S NAME: Mati Costa  MRN:   [id number]  :   2000  ACCT. NUMBER: [id number]  DATE OF SERVICE: 21  START TIME: 12:30 PM  END TIME:  2:30 PM  FACILITATOR(S): Noe Saxena LADC; Jaswinder Asher LADC; Sheela Mayer ADC-T  TOPIC: BEH Group Therapy  Number of patients attending the group:  8  Group Length:  2 Hours    Group Therapy Type: Recovery strategies and Health and wellbeing     Summary of Group / Topics Discussed:    Mindfulness/Relaxation, Trauma informed care, and Relapse prevention    Group Attendance:  Attended group session    Patient's response to the group topic/interactions:  cooperative with task    Patient appeared to be Attentive.        Client specific details: Pt listened respectfully to a lecture on the neurobiology of trauma and addiction and took part in mindfulness meditation.

## 2021-06-06 ENCOUNTER — TELEPHONE (OUTPATIENT)
Dept: BEHAVIORAL HEALTH | Facility: CLINIC | Age: 21
End: 2021-06-06

## 2021-06-06 ENCOUNTER — HOSPITAL ENCOUNTER (OUTPATIENT)
Dept: BEHAVIORAL HEALTH | Facility: CLINIC | Age: 21
End: 2021-06-06
Attending: FAMILY MEDICINE
Payer: COMMERCIAL

## 2021-06-06 VITALS — OXYGEN SATURATION: 98 % | TEMPERATURE: 97.2 F

## 2021-06-06 DIAGNOSIS — F11.10 OPIOID ABUSE (H): Primary | ICD-10-CM

## 2021-06-06 DIAGNOSIS — F11.93 OPIOID WITHDRAWAL (H): ICD-10-CM

## 2021-06-06 PROCEDURE — H2035 A/D TX PROGRAM, PER HOUR: HCPCS | Mod: HQ

## 2021-06-06 PROCEDURE — 1002N00001 HC LODGING PLUS FACILITY CHARGE ADULT

## 2021-06-06 NOTE — TELEPHONE ENCOUNTER
Pt reports not being able to sleep last night.  Takes 100mg Trazodone at night  Waking up and 3-4am and unable to fall back asleep.  Pt stated he is just awake.  Pt denies having obsessive thoughts of worry during this time.  Also, he denies heroin withdrawal symptoms.  Just woke up and was not able to fall back asleep

## 2021-06-06 NOTE — TELEPHONE ENCOUNTER
Please set up f/u appt with this pt to see Dr Philippe FLORIAN.  Pt currently residing at [facility]    Leigh Ann

## 2021-06-06 NOTE — GROUP NOTE
Group Therapy Documentation    PATIENT'S NAME: Mati Costa  MRN:   [id number]  :   2000  ACCT. NUMBER: [id number]  DATE OF SERVICE: 21  START TIME:  1:30 PM  END TIME:  2:30 PM  FACILITATOR(S): Sheela Mayer ADC-T; Jaswinder Asher, [facility]  TOPIC: BEH Group Therapy  Number of patients attending the group:  8  Group Length:  1 Hours    Group Therapy Type: Recovery strategies and Health and wellbeing     Summary of Group / Topics Discussed:    Relapse prevention, Self-care activities, and sexual boundaries    Group Attendance:  Attended group session    Patient's response to the group topic/interactions:  cooperative with task    Patient appeared to be Attentive.        Client specific details: Pt listened respectfully to a lecture on the nature and development of sexual boundaries, and the importance thereof for relapse prevention.

## 2021-06-07 ENCOUNTER — HOSPITAL ENCOUNTER (OUTPATIENT)
Dept: BEHAVIORAL HEALTH | Facility: CLINIC | Age: 21
End: 2021-06-07
Attending: FAMILY MEDICINE
Payer: COMMERCIAL

## 2021-06-07 VITALS — OXYGEN SATURATION: 97 % | TEMPERATURE: 98.3 F

## 2021-06-07 PROCEDURE — 1002N00001 HC LODGING PLUS FACILITY CHARGE ADULT

## 2021-06-07 PROCEDURE — H2035 A/D TX PROGRAM, PER HOUR: HCPCS | Mod: HQ

## 2021-06-07 RX ORDER — TRAZODONE HYDROCHLORIDE 100 MG/1
100 TABLET ORAL
Qty: 60 TABLET | Refills: 1 | Status: SHIPPED | OUTPATIENT
Start: 2021-06-07

## 2021-06-07 NOTE — PROGRESS NOTES
"Comprehensive Assessment Summary     Based on client interview, review of previous assessments and   comprehensive assessment interview the following diagnosis and recommendations are:     Patient: Mati Costa  MRN; [id number]   : 2000  Age: 21 year old Sex: male       Client meets criteria for:  Opioid Use Disorder, Severe (F11.20)  Tobacco Use Disorder, Moderate (F17.200)    Dimension One: Acute Intoxication/Withdrawal Potential     Ratin  (Consider the client's ability to cope with withdrawal symptoms and current state of intoxication)   Patient reports mild to moderate withdrawal symptoms at this time. He is currently taking Buprenorphine HCl-Naloxone HCl (ZUBSOLV).    Dimension Two: Biomedical Condition and Complications    Ratin  (Consider the degree to which any physical disorder would interfere with treatment for substance abuse, and the client's ability to tolerate any related discomfort; determine the impact of continued chemical use on the unborn child if the client is pregnant)   Pt denies any current biomedical conditions that would interfere with treatment.     Dimension Three: Emotional/Behavioral/Cognitive Conditions & Complications  Ratin  (Determine the degree to which any condition or complications are likely to interfere with treatment for substance abuse or with functioning in significant life areas and the likelihood of risk of harm to self or others)   Pt reports a history of depression and anxiety. Patient reports that spirituality \"when sober, it is important\".    Dimension Four: Treatment Acceptance/Resistance     Ratin  (Consider the amount of support and encouragement necessary to keep the client involved in treatment)   Patient appears motivated for recovery with active reinforcement. Patient is currently in the contemplation Stage of Change.     Dimension Five: Continued Use/Relaspe Prevention     Ratin  (Consider the degree to which the client's " recognizes relapse issues and has the skills to prevent relapse of either substance use or mental health problems)  Patient has a history of 3 completed previous treatments but has continued to relapse. Patient needs to identify relapse triggers, warning signs, and ways to cope in a healthy way.     Dimension Six: Recovery Environment     Rating:   3  (Consider the degree to which key areas of the client's life are supportive of or antagonistic to treatment participation and recovery)   Patient reports living with his mom and step dad. Patient denies any current legals but reports a history of DUI and past possession charges. Patient is unemployed and not currently in school.     I have reviewed the information on the assessment, psychosocial and medical history and checklist:        the following changes have been made : DIM VI: 2, 3

## 2021-06-07 NOTE — TELEPHONE ENCOUNTER
Ordered trazodone 100mg, with option to repeat x1 as needed for sleep.    Asked nursing staff to perform UA, so we can provide positive UDS buprenorphine results to insurance to get sublocade approval.    This does NOT obligate the patient to receive sublocade, but is necessary to obtain the appropriate insurance coverage if the patient decides to move forward with this injection.    Christopher Paez MD

## 2021-06-07 NOTE — GROUP NOTE
Group Therapy Documentation    PATIENT'S NAME: Mati Costa  MRN:   [id number]  :   2000  ACCT. NUMBER: [id number]  DATE OF SERVICE: 21  START TIME:  9:00 AM  END TIME: 11:00 AM  FACILITATOR(S): Mari Malloy LADC; Hal Sinha LADC  TOPIC: BEH Group Therapy  Number of patients attending the group:  6  Group Length:  2 Hours    Group Therapy Type: Recovery strategies    Summary of Group / Topics Discussed:    Recovery Principles      Group Attendance:  Attended group session    Patient's response to the group topic/interactions:  cooperative with task    Patient appeared to be Actively participating, Attentive and Engaged.        Client specific details:  Mati attended morning group therapy session.  He participated in discussions about optimism and hope for the future, personal strengths that will help in recovery, and something new recently learned.  Mati demonstrated support for two other group members who graduated from the program by providing positive feedback.

## 2021-06-07 NOTE — GROUP NOTE
Group Therapy Documentation    PATIENT'S NAME: Mati Costa  MRN:   [id number]  :   2000  ACCT. NUMBER: [id number]  DATE OF SERVICE: 21  START TIME: 12:30 PM  END TIME:  2:30 PM  FACILITATOR(S): Mari Malloy LADC; Sweta Scott  TOPIC: BEH Group Therapy  Number of patients attending the group:  6  Group Length:  2 Hours    Group Therapy Type: Recovery strategies    Summary of Group / Topics Discussed:    Recovery Principles      Group Attendance:  Attended group session    Patient's response to the group topic/interactions:  cooperative with task    Patient appeared to be Actively participating, Attentive and Engaged.        Client specific details:  Mati attended group spirituality session.  He participated in a discussion on the universal experience of love, and a discussion and experiential exercise of letting go and forgiveness.

## 2021-06-08 ENCOUNTER — HOSPITAL ENCOUNTER (OUTPATIENT)
Dept: BEHAVIORAL HEALTH | Facility: CLINIC | Age: 21
End: 2021-06-08
Attending: FAMILY MEDICINE
Payer: COMMERCIAL

## 2021-06-08 VITALS — OXYGEN SATURATION: 100 % | TEMPERATURE: 98.1 F

## 2021-06-08 PROCEDURE — 1002N00001 HC LODGING PLUS FACILITY CHARGE ADULT

## 2021-06-08 PROCEDURE — H2035 A/D TX PROGRAM, PER HOUR: HCPCS | Mod: HQ

## 2021-06-08 NOTE — GROUP NOTE
Group Therapy Documentation    PATIENT'S NAME: Mati Costa  MRN:   [id number]  :   2000  ACCT. NUMBER: [id number]  DATE OF SERVICE: 21  START TIME:  9:00 AM  END TIME: 11:00 AM  FACILITATOR(S): Mari Malloy LADC  TOPIC: BEH Group Therapy  Number of patients attending the group:  6  Group Length:  2 Hours    Group Therapy Type: Recovery strategies    Summary of Group / Topics Discussed:    Recovery Principles      Group Attendance:  Attended group session    Patient's response to the group topic/interactions:  cooperative with task    Patient appeared to be Actively participating, Attentive and Engaged.        Client specific details:  Mati attended morning group therapy session.  He participated in discussions on rituals and activities associated with one's using, and pleasing others instead of being true to oneself.  Mati demonstrated support for another group member who presented their First Step assignment by giving positive feedback.

## 2021-06-08 NOTE — TELEPHONE ENCOUNTER
RN hs requested that pt complete a urine drug screen today. He was updated on his trazodone order and will alert nursing if he is still struggling to sleep.

## 2021-06-08 NOTE — GROUP NOTE
Group Therapy Documentation    PATIENT'S NAME: Mati Costa  MRN:   [id number]  :   2000  ACCT. NUMBER: [id number]  DATE OF SERVICE: 21  START TIME:  3:00 PM  END TIME:  4:00 PM  FACILITATOR(S): Meghan Martin LADC; Sheela Mayer ADC-T  TOPIC: BEH Group Therapy  Number of patients attending the group:  6  Group Length:  1 Hours    Group Therapy Type: Recovery strategies and Emotion processing    Summary of Group / Topics Discussed:    Relationship/socialization, Emotions/expression, and Relapse prevention    Group Attendance:  Attended group session    Patient's response to the group topic/interactions:  cooperative with task    Patient appeared to be Attentive and Engaged.        Client specific details:  Pt listened respectfully to a presentation on trust and participated in a group exercise discussing what was learned.

## 2021-06-08 NOTE — TELEPHONE ENCOUNTER
Please have patient come to clinic at 2pm tomorrow for his follow-up visit. I've added this to my schedule.    We will keep his visit on the schedule for the 18th for now, as well, in case we need to follow-up.    Christopher Paez MD

## 2021-06-08 NOTE — GROUP NOTE
Group Therapy Documentation    PATIENT'S NAME: Mati Costa  MRN:   [id number]  :   2000  ACCT. NUMBER: [id number]  DATE OF SERVICE: 21  START TIME: 12:30 PM  END TIME:  2:30 PM  FACILITATOR(S): Michael Villar LADC  TOPIC: BEH Group Therapy  Number of patients attending the group:  6  Group Length:  2 Hours    Group Therapy Type: Recovery strategies    Summary of Group / Topics Discussed:    Relationship/socialization      Group Attendance:  Attended group session    Patient's response to the group topic/interactions:  cooperative with task    Patient appeared to be Actively participating.        Client specific details:  Mati participated and interacted appropriately with peers and staff in PM group. No triggers to use noted or discussed.

## 2021-06-08 NOTE — PROGRESS NOTES
Patient:   Mati Costa     Date:  2000     Comprehensive Assessment UPDATE         Comprehensive Summary Update and Review  Counselor met with patient on 6/8/21 and reviewed the Comprehensive Assessment.    There were no changes/updates identified by patient or in chart entries.

## 2021-06-09 ENCOUNTER — OFFICE VISIT (OUTPATIENT)
Dept: ADDICTION MEDICINE | Facility: CLINIC | Age: 21
End: 2021-06-09
Payer: COMMERCIAL

## 2021-06-09 ENCOUNTER — HOSPITAL ENCOUNTER (OUTPATIENT)
Dept: BEHAVIORAL HEALTH | Facility: CLINIC | Age: 21
End: 2021-06-09
Attending: FAMILY MEDICINE
Payer: COMMERCIAL

## 2021-06-09 VITALS
OXYGEN SATURATION: 100 % | WEIGHT: 129.2 LBS | HEART RATE: 86 BPM | TEMPERATURE: 97.4 F | DIASTOLIC BLOOD PRESSURE: 64 MMHG | BODY MASS INDEX: 17.52 KG/M2 | SYSTOLIC BLOOD PRESSURE: 110 MMHG

## 2021-06-09 VITALS — OXYGEN SATURATION: 100 % | TEMPERATURE: 98.2 F

## 2021-06-09 DIAGNOSIS — F11.90 OPIOID USE DISORDER: Primary | ICD-10-CM

## 2021-06-09 DIAGNOSIS — F11.90 OPIOID USE DISORDER: ICD-10-CM

## 2021-06-09 DIAGNOSIS — F32.A DEPRESSION, UNSPECIFIED DEPRESSION TYPE: ICD-10-CM

## 2021-06-09 LAB
AMPHETAMINES UR QL: NOT DETECTED NG/ML
BARBITURATES UR QL SCN: NOT DETECTED NG/ML
BENZODIAZ UR QL SCN: NOT DETECTED NG/ML
BUPRENORPHINE UR QL: ABNORMAL NG/ML
CANNABINOIDS UR QL: NOT DETECTED NG/ML
COCAINE UR QL SCN: NOT DETECTED NG/ML
D-METHAMPHET UR QL: NOT DETECTED NG/ML
METHADONE UR QL SCN: NOT DETECTED NG/ML
OPIATES UR QL SCN: NOT DETECTED NG/ML
OXYCODONE UR QL SCN: NOT DETECTED NG/ML
PCP UR QL SCN: NOT DETECTED NG/ML
PROPOXYPH UR QL: NOT DETECTED NG/ML
TRICYCLICS UR QL SCN: NOT DETECTED NG/ML

## 2021-06-09 PROCEDURE — 80306 DRUG TEST PRSMV INSTRMNT: CPT | Performed by: FAMILY MEDICINE

## 2021-06-09 PROCEDURE — 1002N00001 HC LODGING PLUS FACILITY CHARGE ADULT

## 2021-06-09 PROCEDURE — H2035 A/D TX PROGRAM, PER HOUR: HCPCS | Mod: HQ

## 2021-06-09 PROCEDURE — 99214 OFFICE O/P EST MOD 30 MIN: CPT | Performed by: FAMILY MEDICINE

## 2021-06-09 RX ORDER — BUPRENORPHINE HYDROCHLORIDE AND NALOXONE HYDROCHLORIDE 8.6; 2.1 MG/1; MG/1
1 TABLET, ORALLY DISINTEGRATING SUBLINGUAL DAILY
Qty: 14 TABLET | Refills: 0 | Status: SHIPPED | OUTPATIENT
Start: 2021-06-09 | End: 2021-06-18

## 2021-06-09 NOTE — GROUP NOTE
Group Therapy Documentation    PATIENT'S NAME: Mati Costa  MRN:   [id number]  :   2000  ACCT. NUMBER: [id number]  DATE OF SERVICE: 21  START TIME:  8:30 AM  END TIME:  9:30 AM  FACILITATOR(S): Noe Saxena LADC; Missy Roman; Nathaniel Bowie LADC  TOPIC: BEH Group Therapy  Number of patients attending the group:  29  Group Length:  1 Hours    Group Therapy Type: Recovery strategies    Summary of Group / Topics Discussed:    12 Steps      Group Attendance:  Attended group session    Patient's response to the group topic/interactions:  cooperative with task    Patient appeared to be Actively participating.        Client specific details:  Lecture this morning was on the 12 steps. Pt was attentive and participated.

## 2021-06-09 NOTE — PROGRESS NOTES
Patient:  Mati Costa    Day Monday Tuesday Wednesday Thursday Friday Saturday Eliseo  Group Hours    0 hours 0 hours 0 hours 0 hours 0 hours 4 hours 2 hours  Skills Hours    0 hours 0 hours 0 hours 0 hours 0 hours 0 hours 1 hours  Individual Session (LADC)     0 hours 0 hours 0 hours 0 hours 0 hours 0 hours 0 hours  Individual Session  (psychotherapy)    0 hours 0 hours 0 hours 0 hours 0 hours 0 hours 0 hours  Peer-led Recovery Group    0 hours 0 hours 0 hours 0 hours 0 hours 1.0 hours 1.0 hours              Adult CD Progress Note and Treatment Plan Review     Attendance  Please refer to OP BEH CD Adult Attendance Record Documentation Flowsheet    Support group attended this week: Yes     Reporting sobriety: Yes    Treatment Plan     Treatment Plan Review competed on: 6/9/2021     Client preferred learning style:   Verbal  Hands-on  Demonstration  Reading/ written    Staff Members contributing: Michael Villar, [facility]; Hal Sinha [facility]; Madison Moody                         Received Supervision: Madison Moody     Client: Pt contributed to goals and plan.    Client received copy of plan/revised plan: Pt's tx plan is being worked on     Client agrees with plan/revised plan: Pt's tx plan is being worked on         Changes to Treatment Plan:  Pt's tx plan is being worked on     New Goals added since last review: Pt's tx plan is being worked on     Goals worked on since last review: Pt's tx plan is being worked on     Strategies effective: Pt's tx plan is being worked on     Strategies need these changes: Pt's tx plan is being worked on     1) Care Coordination Activities: Pt is exploring his aftercare options at this time. Pt expressed interest in attending AA/NA meetings and psychotherapy.  2) Medical, Mental Health, and other appointments the client attended: Pt reported no appointments this past week.  3) Medication issues: Pt reported no concerns at this time.  4) Physical and mental health problems:  "Pt reported no concerns at this time.  5) Any changes in Vulnerable Adult Status? No. If yes, add to treatment plan and individual abuse prevention plan.  6) Review and evaluation of the individual abuse prevention plan: Current IAPP for this program is adequate for this client.    ASAM Risk Rating:    Dimension 1 0: Patient reports his last date of use for alcohol as 6/3/2021. Patient denies any withdrawal symptoms that would interfere with full participation in treatment programming at this time. Patient will continue to be monitored throughout treatment.    Dimension 2 0: Pt denied having medical or medication issues this past week. Pt did not attend any healthcare appointments this past week and denied scheduling any appointments for the future.    Dimension 3 2: Pt denied having suicidal thoughts at this time. Pt reported changes in his mood this past week due to \"his mood has improved a lot after he started feeling better.\" Pt reported no changes in his stress level this past week. Pt reported using \"nothing\" as his coping techniques for dealing with difficult emotions this past week. Pt reported no triggers to use this past week.    Dimension 4 2: Pt expresses internal motivation for change. Pt is active in group process, accepts and provides feedback, has good insight, and appears engaged. Pt is supportive of his group peers. Pt reported that \"his mom and other family\" are who motivated him to be sober and to stay in treatment this week.    Dimension 5 4: Pt reported that his cravings were at a 9 this past week on a scale of 1 to 10, 10 being the highest/ most severe. Pt reported that \"nothing\" has been his coping skills he used to manage cravings this past week.    Dimension 6 3: Pt is exploring his aftercare options at this time. Pt expressed interest in attending AA/NA meetings and psychotherapy.    Any changes in Vulnerable Adult Status?    If yes, add to treatment plan and individual abuse prevention " "plan.    Family Involvement:   Pt did not have family members or concerned persons participate in his tx programming or visitation this past week.    Data:   Pt offered feedback, had good insight, and patient actively participated in group. Pt shares openly during group check-in.  Pt reported that his recreation activities he engaged in this past week were: \"going to the park\"  Pt reported that he got along with peers and staff \"fine\" this past week.  Pt is also gaining trust of peers and counselors.  Pt expressed concern and frustration about not being able to maintain sobriety even after previous suggested tx and aftercare.       Intervention:   Counselor feedback  Group feedback  Relapse prevention  Aftercare planning  Cognitive behavior therapy  Counselor feedback  Education  Emotional management  Motivational enhancement therapy   Twelve Step facilitation  Mental health education  Pt and counselor reviewed and signed ISP and assessment summary.      Assessment:   Stages of Change Model  Preparation     Appears/ Sounds:  Cooperative  Motivated  Engaged      Plan:  Focus on recovery environment  Monitor emotional/physical health    Continue group therapy, go to AA/NA meetings when available, work with sponsor, build sober support network, engage in daily structured activities, and have sober fun.            VENECIA Borrero        "

## 2021-06-09 NOTE — GROUP NOTE
"Group Therapy Documentation    PATIENT'S NAME: Mati Costa  MRN:   [id number]  :   2000  ACCT. NUMBER: [id number]  DATE OF SERVICE: 21  START TIME: 10:45 AM  END TIME: 11:15 AM  FACILITATOR(S): Hal Sinha LADC  TOPIC: BEH Group Therapy  Number of patients attending the group:  7  Group Length:  1.5 Hours    Group Therapy Type: Recovery strategies    Summary of Group / Topics Discussed:    Recovery Principles      Group Attendance:  Attended group session    Patient's response to the group topic/interactions:  cooperative with task    Patient appeared to be Attentive.        Client specific details:  Mati attended AM group. Patient took part in a discussion on \"I owe it to myself\" relapse trap. Patient checked in and talked about what they need to improve and something they are already doing well.      "

## 2021-06-09 NOTE — PROGRESS NOTES
SUBJECTIVE                                                    SUBSTANCE USE DISORDER FOLLOW UP:    Mati Costa is a 21 year old male who presents to clinic today for follow up of Opioid Use Disorder (OUD).    Visit performed In Person, face-to-face          TODAY'S VISIT  HPI Jun 9, 2021  - Mental health stable, no concerns, no SI  - [facility] course improving. Settling into treatment  - Remains interested in sublocade  - Zubsolv working well for cravings, well-tolerated  - Advised I will return a call to his mom regarding MH concerns, and he has no concerns with this      Brief TIM History  Per intake on 4/14/21:  ROUTE OF SUBSTANCE ADMINISTRATION - Heroin - last used yesterday, injects and sometimes snorts     LONGEST PERIOD OF SOBRIETY - 8 months, last year got out of treatment               - Significant protective factors - living in a sober house was helpful      PREVIOUS DETOX/TREATMENT PROGRAMS - 2 years ago, 3 years ago      HISTORY OF OVERDOSE - Since December - 15 times. Has Narcan.     PREVIOUS MEDICATION ASSISTED TREATMENT -  previous course of suboxone 2018-9 for short period of time. Was somewhat helpful. Was difficult to take every day due to GI side effects. Usually takes through withdrawal then stops. Has bought off the street. Last took day after detox.  Methadone taken but not prescribed.     CURRENT RECOVERY ACTIVITIES - outpatient [facility], hasn't gotten back to him yet for intake      Other Substances:     ALCOHOL- minimal, don't use alcohol   MARIJUANA- none  PRESCRIPTION STIMULANTS- none  COCAINE/CRACK- used coke one week ago - injection   METH/AMPHETAMINES-not in last year, used in the past   OPIATES- heroin - tries to use clean needles but not always   BENZODIAZEPINES- Hasn't done recently, worried it's in heroin, has had pos urine    A/P                                                    ASSESSMENT/PLAN    Diagnoses and all orders for this visit:  Opioid use disorder (H)  -      Urine Drugs of Abuse Screen Panel 13; Future  Depression, unspecified depression type      Orders Placed This Encounter   Medications     Buprenorphine HCl-Naloxone HCl (ZUBSOLV) 8.6-2.1 MG SUBL     Sig: Place 1 tablet under the tongue daily     Dispense:  14 tablet     Refill:  0     NADEAN: [id number]; Please substitute for covered alternative per insurance request. LP patient, please facilitate delivery, thank you     - Continues to consider sublocade  - Will continue zubsolv at stable dosing, equivalent to 12mg suboxone daily  - Reviewed  and he has no concerns. Interested in counseling  - Continues with [facility]ging plus; encouraged improved participation  - Will need HIV/HepC testing end of July      Patient has narcan?  Yes  Reported IVDU in past 2 months?  Yes  Infectious disease screening UTD?  No        RTC:  <2 weeks    ENCOUNTER FOR LONG TERM USE OF HIGH RISK MEDICATION   High Risk Drug Monitoring?  YES   Drug being monitored: buprenorphine   Reason for drug: Opioid Use Disorder (OUD)   What is being monitored?: Dosage, Cravings, Trigger, side effects, and continued abstinence.    Counseled the patient on the importance of having a recovery program in addition to medication to manage recovery.  Components include avoiding isolating, having willingness to change, avoiding triggers and managing cravings. Encouraged having some type of sober network and practicing honesty with trusted support person(s). Encouraged other services such as counseling, 12 step or other self-help organizations.      Opioid warning reviewed.  Risk of overdose following a period of abstinence due to decrease tolerance was discussed including risk of death.  Strongly recommended abstain from alcohol, benzodiazepines, THC, opioids and other drugs of abuse.  Increased risk of return to opioid use after use of these substances discussed.  Increased risk of overdose/death with use of other substances particularly benzodiazepines/alcohol  reviewed.    [facility] Board of Pharmacy Data Base Reviewed;   Consistent with patient reports and Epic records.          OBJECTIVE                                                    PHYSICAL EXAM:  /64   Pulse 86   Temp 97.4  F (36.3  C) (Temporal)   Wt 58.6 kg (129 lb 3.2 oz)   SpO2 100%   BMI 17.52 kg/m      GENERAL: healthy, alert and no distress  EYES: Eyes grossly normal to inspection, PERRL and conjunctivae and sclerae normal  RESP: No respiratory distress  MENTAL STATUS EXAM  Appearance/Behavior: No appearant distress  Speech: Normal  Mood/Affect: normal affect  Insight: Adequate    LAB  Results for orders placed or performed in visit on 06/09/21   Urine Drugs of Abuse Screen Panel 13     Status: Abnormal   Result Value Ref Range    Cannabinoids (33-iwq-6-carboxy-9-THC) Not Detected NDET^Not Detected ng/mL    Phencyclidine (Phencyclidine) Not Detected NDET^Not Detected ng/mL    Cocaine (Benzoylecgonine) Not Detected NDET^Not Detected ng/mL    Methamphetamine (d-Methamphetamine) Not Detected NDET^Not Detected ng/mL    Opiates (Morphine) Not Detected NDET^Not Detected ng/mL    Amphetamine (d-Amphetamine) Not Detected NDET^Not Detected ng/mL    Benzodiazepines (Nordiazepam) Not Detected NDET^Not Detected ng/mL    Tricyclic Antidepressants (Desipramine) Not Detected NDET^Not Detected ng/mL    Methadone (Methadone) Not Detected NDET^Not Detected ng/mL    Barbiturates (Butalbital) Not Detected NDET^Not Detected ng/mL    Oxycodone (Oxycodone) Not Detected NDET^Not Detected ng/mL    Propoxyphene (Norpropoxyphene) Not Detected NDET^Not Detected ng/mL    Buprenorphine (Buprenorphine) Detected, Abnormal Result (A) NDET^Not Detected ng/mL         HISTORY                                                    Problem list reviewed & adjusted, as indicated.  Patient Active Problem List   Diagnosis     Psychotic disorder (H)     Alcohol abuse     Opioid abuse (H)     Opioid use disorder (H)     Chemical  dependency (H)         MEDICATION LIST (prior to visit)  naloxone (NARCAN) 4 MG/0.1ML nasal spray, Spray 1 spray (4 mg) into one nostril alternating nostrils as needed for opioid reversal every 2-3 minutes until assistance arrives  naloxone (NARCAN) 4 MG/0.1ML nasal spray, Spray 1 spray (4 mg) into one nostril alternating nostrils as needed for opioid reversal every 2-3 minutes until assistance arrives  traZODone (DESYREL) 100 MG tablet, Take 1 tablet (100 mg) by mouth at bedtime as needed, may repeat once for sleep  citalopram (CELEXA) 20 MG tablet, Take 20 mg by mouth every morning     Self Administer Medications: Behavioral Services        No Known Allergies        Christopher Paez MD  [facility]  [phone]

## 2021-06-09 NOTE — GROUP NOTE
Group Therapy Documentation    PATIENT'S NAME: Mati Costa  MRN:   [id number]  :   2000  ACCT. NUMBER: [id number]  DATE OF SERVICE: 21  START TIME: 12:30 PM  END TIME:  2:30 PM  FACILITATOR(S): Michael Villar LADC  TOPIC: BEH Group Therapy  Number of patients attending the group:  7  Group Length:  2 Hours    Group Therapy Type: Health and wellbeing     Summary of Group / Topics Discussed:    Disease of addiction      Group Attendance:  Attended group session    Patient's response to the group topic/interactions:  cooperative with task    Patient appeared to be Actively participating.        Client specific details:  Mati participated and interacted appropriately with peers and staff in PM group. No triggers to use noted or discussed.

## 2021-06-10 ENCOUNTER — HOSPITAL ENCOUNTER (OUTPATIENT)
Dept: BEHAVIORAL HEALTH | Facility: CLINIC | Age: 21
End: 2021-06-10
Attending: FAMILY MEDICINE
Payer: COMMERCIAL

## 2021-06-10 VITALS — OXYGEN SATURATION: 100 % | TEMPERATURE: 98.1 F

## 2021-06-10 PROCEDURE — H2035 A/D TX PROGRAM, PER HOUR: HCPCS | Mod: HQ

## 2021-06-10 PROCEDURE — 1002N00001 HC LODGING PLUS FACILITY CHARGE ADULT

## 2021-06-10 NOTE — GROUP NOTE
Group Therapy Documentation    PATIENT'S NAME: Mati Costa  MRN:   [id number]  :   2000  ACCT. NUMBER: [id number]  DATE OF SERVICE: 6/10/21  START TIME: 12:30 PM  END TIME:  2:30 PM  FACILITATOR(S): Mari Malloy LADC; Michael Villar LADC  TOPIC: BEH Group Therapy  Number of patients attending the group:  7  Group Length:  2 Hours    Group Therapy Type: Recovery strategies    Summary of Group / Topics Discussed:    Recovery Principles      Group Attendance:  Attended group session    Patient's response to the group topic/interactions:  cooperative with task    Patient appeared to be Actively participating, Attentive and Engaged.        Client specific details:  Mati attended afternoon group therapy session.  He participated in a discussion on addiction and watched a film on addiction with the group.

## 2021-06-10 NOTE — GROUP NOTE
Psychoeducation Group Documentation    PATIENT'S NAME: Mati Costa  MRN:   [id number]  :   2000  ACCT. NUMBER: [id number]  DATE OF SERVICE: 6/10/21  START TIME:  3:00 PM  END TIME:  4:00 PM  FACILITATOR(S): Sheela Mayer ADC-T; Missy Roman; Nathaniel Bowie, [facility] HealthcareFAUSTINO  TOPIC: BEH Pyschoeducation  Number of patients attending the group:  7  Group Length:  1 Hours    Skills Group Therapy Type: Healthy behaviors development    Summary of Group / Topics Discussed:    Balanced lifestyle skills, Relapse prevention skills, and neuroscience of addiction    Group Attendance:  Attended group session    Patient's response to the group topic/interactions:  cooperative with task    Patient appeared to be Attentive and Engaged.         Client specific details:  Pt was attentive during Dr. Meza's lecture on the neuroscience of addiction/effects thereof on the brain.

## 2021-06-10 NOTE — GROUP NOTE
Group Therapy Documentation    PATIENT'S NAME: Mati Costa  MRN:   [id number]  :   2000  ACCT. NUMBER: [id number]  DATE OF SERVICE: 6/10/21  START TIME: 12:30 PM  END TIME:  2:30 PM  FACILITATOR(S): Mari Malloy LADC; Michael Villar LADC  TOPIC: BEH Group Therapy  Number of patients attending the group:  6  Group Length:  2 Hours    Group Therapy Type: Recovery strategies    Summary of Group / Topics Discussed:    Recovery Principles      Group Attendance:  {Group Attendance:231383}    Patient's response to the group topic/interactions:  {OPBEHCLIENTRESPONSE:503792}    Patient appeared to be {Engagement:203320}.        Client specific details:  ***.

## 2021-06-10 NOTE — PROGRESS NOTES
Counselor was looking for Mati at 3:10pm for Skills group at 3pm and pt was upstairs eating a [employer] and said he d be down when he s finished.    Writer spoke with him, pt stated that he forgot that Skills group was today. I reminded him.

## 2021-06-11 ENCOUNTER — HOSPITAL ENCOUNTER (OUTPATIENT)
Dept: BEHAVIORAL HEALTH | Facility: CLINIC | Age: 21
End: 2021-06-11
Attending: FAMILY MEDICINE
Payer: COMMERCIAL

## 2021-06-11 VITALS — OXYGEN SATURATION: 99 % | TEMPERATURE: 98.1 F

## 2021-06-11 PROCEDURE — 1002N00001 HC LODGING PLUS FACILITY CHARGE ADULT

## 2021-06-11 PROCEDURE — H2035 A/D TX PROGRAM, PER HOUR: HCPCS | Mod: HQ

## 2021-06-11 NOTE — GROUP NOTE
Group Therapy Documentation    PATIENT'S NAME: Mati Costa  MRN:   [id number]  :   2000  ACCT. NUMBER: [id number]  DATE OF SERVICE: 21  START TIME: 12:30 PM  END TIME:  2:30 PM  FACILITATOR(S): Hal Sinha LADC  TOPIC: BEH Group Therapy  Number of patients attending the group:  8  Group Length:  2 Hours    Group Therapy Type: Recovery strategies    Summary of Group / Topics Discussed:    Recovery Principles, Sober coping skills, and Relapse prevention      Group Attendance:  Attended group session    Patient's response to the group topic/interactions:  cooperative with task    Patient appeared to be Attentive.        Client specific details:  Mati attended PM group. Patient attended a multi-media presentation on a musician in recovery ([employer])  and discussion how he addressed major life upheaval while in recovery.

## 2021-06-11 NOTE — GROUP NOTE
Group Therapy Documentation    PATIENT'S NAME: Mati Costa  MRN:   [id number]  :   2000  ACCT. NUMBER: [id number]  DATE OF SERVICE: 21  START TIME:  9:00 AM  END TIME: 11:00 AM  FACILITATOR(S): Mari Malloy LADC; Hal Sinha LADC  TOPIC: BEH Group Therapy  Number of patients attending the group:  8  Group Length:  2 Hours    Group Therapy Type: Recovery strategies    Summary of Group / Topics Discussed:    Recovery Principles      Group Attendance:  Attended group session    Patient's response to the group topic/interactions:  cooperative with task    Patient appeared to be Actively participating, Attentive and Engaged.        Client specific details:  Mati attended morning group therapy session.  He participated in discussions on relapse traps, what brought him into treatment, and a recent fear he overcame.  Mati demonstrated support for another group member who presented an assignment by providing positive feedback.

## 2021-06-12 ENCOUNTER — HOSPITAL ENCOUNTER (OUTPATIENT)
Dept: BEHAVIORAL HEALTH | Facility: CLINIC | Age: 21
End: 2021-06-12
Attending: FAMILY MEDICINE
Payer: COMMERCIAL

## 2021-06-12 VITALS — OXYGEN SATURATION: 100 % | TEMPERATURE: 98.2 F

## 2021-06-12 PROCEDURE — 1002N00001 HC LODGING PLUS FACILITY CHARGE ADULT

## 2021-06-12 PROCEDURE — H2035 A/D TX PROGRAM, PER HOUR: HCPCS | Mod: HQ

## 2021-06-12 NOTE — GROUP NOTE
Group Therapy Documentation    PATIENT'S NAME: Mati Costa  MRN:   [id number]  :   2000  ACCT. NUMBER: [id number]  DATE OF SERVICE: 21  START TIME: 12:30 AM  END TIME:  2:30 PM  FACILITATOR(S): Michael Baeza; Dung Park LADC  TOPIC: BEH Group Therapy  Number of patients attending the group:  8  Group Length:  2 Hours    Group Therapy Type: Recovery strategies and Daily living/independence skills    Summary of Group / Topics Discussed:    Relationship/socialization and Emotions/expression      Group Attendance:  Attended group session    Patient's response to the group topic/interactions:  cooperative with task    Patient appeared to be Engaged.        Client specific details:  Mati learned about interpersonal skills and communication.

## 2021-06-12 NOTE — GROUP NOTE
Group Therapy Documentation    PATIENT'S NAME: Mati Costa  MRN:   [id number]  :   2000  ACCT. NUMBER: [id number]  DATE OF SERVICE: 21  START TIME:  9:00 AM  END TIME: 11:00 AM  FACILITATOR(S): Nathaniel Bowie LADC; Dung Park LADC  TOPIC: BEH Group Therapy  Number of patients attending the group:  8  Group Length:  2 Hours    Group Therapy Type: Recovery strategies and Emotion processing    Summary of Group / Topics Discussed:    Sober coping skills and Relationship/socialization      Group Attendance:  Attended group session    Patient's response to the group topic/interactions:  cooperative with task and discussed personal experience with topic    Patient appeared to be Actively participating and Engaged.        Client specific details:  Mati learned about relationship skills with self and others.

## 2021-06-12 NOTE — PROGRESS NOTES
"Pt left PM lecture 45 mins early. Pt stated to counselor observing the lecture that he was upset with writer. Writer found pt in room and pt verbalized frustration about others in lecture who he observed \"falling asleep\" and \"not wanting to be here\" not get \"talked to\" like he had. Writer asked him to just be concerned about himself and let staff deal with other pt's issues as they arise. Pt stated that he \"needed a minute\" before returning to lecture. Writer left him in his room. Pt did not return to lecture.   "

## 2021-06-12 NOTE — PROGRESS NOTES
Pt was reported to have his head down and asleep at his table in cafeteria for duration of AM 2 hour lecture. Writer told the pt that if he falls asleep in group or lecture again for the duration of his tx he will be discharged. Pt stated that he has not been sleeping well and that he is working with his doctor regarding his sleep.

## 2021-06-13 ENCOUNTER — HOSPITAL ENCOUNTER (OUTPATIENT)
Dept: BEHAVIORAL HEALTH | Facility: CLINIC | Age: 21
End: 2021-06-13
Attending: FAMILY MEDICINE
Payer: COMMERCIAL

## 2021-06-13 VITALS — OXYGEN SATURATION: 97 % | TEMPERATURE: 97.5 F

## 2021-06-13 PROCEDURE — H2035 A/D TX PROGRAM, PER HOUR: HCPCS | Mod: HQ

## 2021-06-13 PROCEDURE — 1002N00001 HC LODGING PLUS FACILITY CHARGE ADULT

## 2021-06-13 NOTE — GROUP NOTE
Psychoeducation Group Documentation    PATIENT'S NAME: Mati Costa  MRN:   [id number]  :   2000  ACCT. NUMBER: [id number]  DATE OF SERVICE: 21  START TIME:  8:45 AM  END TIME: 10:40 AM  FACILITATOR(S): Lottie Ness RN; Nathaniel Bowie LADC  TOPIC: BEH Pyschoeducation  Number of patients attending the group:  27  Group Length:  2 Hours    Skills Group Therapy Type: Healthy behaviors development    Summary of Group / Topics Discussed:    Relationship/social skills, Balanced lifestyle skills, and Medication management skills          Group Attendance:  Attended group session    Patient's response to the group topic/interactions:  cooperative with task    Patient appeared to be Attentive and Engaged.         Client specific details:  .

## 2021-06-13 NOTE — GROUP NOTE
Group Therapy Documentation    PATIENT'S NAME: Mati Costa  MRN:   [id number]  :   2000  ACCT. NUMBER: [id number]  DATE OF SERVICE: 21  START TIME: 12:30 PM  END TIME:  1:30 PM  FACILITATOR(S): Sarath Calhoun LADC; Nathaniel Bowie LADC  TOPIC: BEH Group Therapy  Number of patients attending the group:  28  Group Length:  1 Hours    Group Therapy Type: Recovery strategies    Summary of Group / Topics Discussed:    Recovery Principles, Spiritual Care, and Sober coping skills      Group Attendance:  Attended group session    Patient's response to the group topic/interactions:  cooperative with task    Patient appeared to be Actively participating, Attentive and Engaged.        Client specific details:  .

## 2021-06-14 ENCOUNTER — HOSPITAL ENCOUNTER (OUTPATIENT)
Dept: BEHAVIORAL HEALTH | Facility: CLINIC | Age: 21
End: 2021-06-14
Attending: FAMILY MEDICINE
Payer: COMMERCIAL

## 2021-06-14 VITALS — TEMPERATURE: 97.2 F | OXYGEN SATURATION: 97 %

## 2021-06-14 PROCEDURE — 1002N00001 HC LODGING PLUS FACILITY CHARGE ADULT

## 2021-06-14 PROCEDURE — H2035 A/D TX PROGRAM, PER HOUR: HCPCS | Mod: HQ

## 2021-06-14 NOTE — GROUP NOTE
Group Therapy Documentation    PATIENT'S NAME: Mati Costa  MRN:   [id number]  :   2000  ACCT. NUMBER: [id number]  DATE OF SERVICE: 21  START TIME: 12:30 PM  END TIME:  2:30 PM  FACILITATOR(S): Michael Villar LADC; Sweta Scott  TOPIC: BEH Group Therapy  Number of patients attending the group:  7  Group Length:  2 Hours    Group Therapy Type: Recovery strategies    Summary of Group / Topics Discussed:    Spiritual Care      Group Attendance:  Attended group session    Patient's response to the group topic/interactions:  cooperative with task    Patient appeared to be Actively participating.        Client specific details:  Mati participated and interacted appropriately with peers and staff in PM group. No triggers to use noted or discussed.

## 2021-06-14 NOTE — GROUP NOTE
Group Therapy Documentation    PATIENT'S NAME: Mati Costa  MRN:   [id number]  :   2000  ACCT. NUMBER: [id number]  DATE OF SERVICE: 21  START TIME:  9:00 AM  END TIME: 11:00 AM  FACILITATOR(S): Hal Sinha LADC  TOPIC: BEH Group Therapy  Number of patients attending the group:  7  Group Length:  2 Hours    Group Therapy Type: Recovery strategies    Summary of Group / Topics Discussed:    Recovery Principles      Group Attendance:  Attended group session    Patient's response to the group topic/interactions:  cooperative with task    Patient appeared to be Attentive.        Client specific details:  Mati attended AM group. Patient took part in a discussion on finding meaning in their recovery. Patient also checked in, graduated a peer, and talked about how addiction has impacted their mental health.

## 2021-06-14 NOTE — PROGRESS NOTES
Patient:  Mati Costa    Day Monday Tuesday Wednesday Thursday Friday Saturday Eliseo  Group Hours    4 hours 4 hours 5 hours 4 hours 4 hours 4 hours 2 hours  Skills Hours    0 hours 1.0 hours 0 hours 1.0 hours 0 hours 0 hours 1 hours  Individual Session (LADC)     0 hours 0 hours 0 hours 0 hours 0 hours 0 hours 0 hours  Individual Session  (psychotherapy)    0 hours 0 hours 0 hours 0 hours 0 hours 0 hours 0 hours  Peer-led Recovery Group    1.0 hours 1.0 hours 1.0 hours 1.0 hours 1.0 hours 1.0 hours 1.0 hours              Adult CD Progress Note and Treatment Plan Review     Attendance  Please refer to OP BEH CD Adult Attendance Record Documentation Flowsheet    Support group attended this week: Yes     Reporting sobriety: Yes    Treatment Plan     Treatment Plan Review competed on: 6/14/2021     Client preferred learning style:   Verbal  Hands-on  Demonstration  Reading/ written    Staff Members contributing: Michael Villar [facility]; Hal Sinha [facility]; Madison Moody                         Received Supervision: Madison Moody     Client: Pt contributed to goals and plan.    Client received copy of plan/revised plan: Yes     Client agrees with plan/revised plan: Yes        Changes to Treatment Plan: None at this time    New Goals added since last review: Relapse prevention, address mental health needs, build sober support network, actively find a sponsor, attend 2-3 12-Step meetings per week, tx plan assignments.      Goals worked on since last review: Aftercare planning, sobriety, tx plan assignments, group therapy, build sober support network, psychoeducation.     Strategies effective: Yes     Strategies need these changes: None at this time     1) Care Coordination Activities: Pt is exploring his aftercare options at this time. Pt expressed interest in attending AA/NA meetings and psychotherapy.  2) Medical, Mental Health, and other appointments the client attended: Pt reported having an appointment  "with Dr. Paez this past week.  3) Medication issues: Pt reported no concerns at this time.  4) Physical and mental health problems: Pt reported no concerns at this time.  5) Any changes in Vulnerable Adult Status? No. If yes, add to treatment plan and individual abuse prevention plan.  6) Review and evaluation of the individual abuse prevention plan: Current IAPP for this program is adequate for this client.    ASAM Risk Rating:    Dimension 1 0: Patient reports his last date of use for alcohol as 6/3/2021. Patient denies any withdrawal symptoms that would interfere with full participation in treatment programming at this time. Patient will continue to be monitored throughout treatment.    Dimension 2 0: Pt denied having medical or medication issues this past week. Pt reported having an appointment with Dr. Paez this past week. Pt denied scheduling any appointments for the future.    Dimension 3 2: Pt denied having suicidal thoughts at this time. Pt reported changes in his mood this past week due to \"more anger.\" Pt reported no changes in his stress level this past week. Pt reported using \"pausing and thinking before reacting\" as his coping techniques for dealing with difficult emotions this past week. Pt reported no triggers to use this past week.    Dimension 4 2: Pt expresses internal motivation for change. Pt is active in group process, accepts and provides feedback, has good insight, and appears engaged. Pt is supportive of his group peers. Pt reported that \"being in an overall better mood\" is what motivated him to be sober and to stay in treatment this week.    Dimension 5 4: Pt reported that his cravings were at a 0 this past week on a scale of 1 to 10, 10 being the highest/ most severe. Pt reported that \"nothing\" has been his coping skills he used to manage cravings this past week.    Dimension 6 3: Pt is exploring his aftercare options at this time. Pt expressed interest in attending AA/NA meetings " "and psychotherapy.    Any changes in Vulnerable Adult Status?    If yes, add to treatment plan and individual abuse prevention plan.    Family Involvement:   Pt did not have family members or concerned persons participate in his tx programming or visitation this past week. Pt was in contact with his mother via phone who helped his adjust his thinking this past week.    Data:   Pt offered feedback, had good insight, and patient actively participated in group. Pt shares openly during group check-in.  Pt reported no recreation activities he engaged in this past week.  Pt reported that he got along with peers and staff \"decent\" this past week.  Pt had behavioral issues over the past weekend during programming that have since been resolved.        Intervention:   Counselor feedback  Group feedback  Relapse prevention  Aftercare planning  Cognitive behavior therapy  Counselor feedback  Education  Emotional management  Motivational enhancement therapy   Twelve Step facilitation  Mental health education      Assessment:   Stages of Change Model  Preparation     Appears/ Sounds:  Cooperative  Motivated  Engaged      Plan:  Focus on recovery environment  Monitor emotional/physical health    Continue group therapy, go to AA/NA meetings when available, work with sponsor, build sober support network, engage in daily structured activities, and have sober fun.            VENECIA Borrero        "

## 2021-06-15 ENCOUNTER — HOSPITAL ENCOUNTER (OUTPATIENT)
Dept: BEHAVIORAL HEALTH | Facility: CLINIC | Age: 21
End: 2021-06-15
Attending: FAMILY MEDICINE
Payer: COMMERCIAL

## 2021-06-15 VITALS — TEMPERATURE: 98.1 F | OXYGEN SATURATION: 100 %

## 2021-06-15 PROCEDURE — 1002N00001 HC LODGING PLUS FACILITY CHARGE ADULT

## 2021-06-15 PROCEDURE — H2035 A/D TX PROGRAM, PER HOUR: HCPCS | Mod: HQ

## 2021-06-15 NOTE — GROUP NOTE
Group Therapy Documentation    PATIENT'S NAME: Mati Costa  MRN:   [id number]  :   2000  ACCT. NUMBER: [id number]  DATE OF SERVICE: 6/15/21  START TIME:  3:00 PM  END TIME:  4:00 PM  FACILITATOR(S): Hal Sinha LADC; Sheela Mayer LADC  TOPIC: BEH Group Therapy  Number of patients attending the group: 29  Group Length:  1 Hours    Group Therapy Type: Recovery strategies and Emotion processing    Summary of Group / Topics Discussed:    Recovery Principles, Cognitive behavioral therapy skills, and Emotions/expression      Group Attendance:  Attended group session    Patient's response to the group topic/interactions:  cooperative with task    Patient appeared to be Attentive.        Client specific details:  Mati attended PM Skills group. They took part in a group discussion/exercise on gratitude and its various aspects applied in recovery.

## 2021-06-15 NOTE — GROUP NOTE
"Group Therapy Documentation    PATIENT'S NAME: Mati Costa  MRN:   [id number]  :   2000  ACCT. NUMBER: [id number]  DATE OF SERVICE: 6/15/21  START TIME:  9:00 AM  END TIME: 11:00 AM  FACILITATOR(S): Hal Sinha LADC  TOPIC: BEH Group Therapy  Number of patients attending the group:  8  Group Length:  2 Hours    Group Therapy Type: Recovery strategies    Summary of Group / Topics Discussed:    Recovery Principles      Group Attendance:  Attended group session    Patient's response to the group topic/interactions:  cooperative with task    Patient appeared to be Attentive.        Client specific details:  Mati attended AM group. Patient took part in a discussion on their relationship with their father. Patient also watched a peer present his \"Use History\" and gave them feedback.  "

## 2021-06-15 NOTE — GROUP NOTE
Group Therapy Documentation    PATIENT'S NAME: Mati Costa  MRN:   [id number]  :   2000  ACCT. NUMBER: [id number]  DATE OF SERVICE: 6/15/21  START TIME: 12:30 PM  END TIME:  2:30 PM  FACILITATOR(S): Michael Villar LADC  TOPIC: BEH Group Therapy  Number of patients attending the group:  8  Group Length:  2 Hours    Group Therapy Type: Emotion processing    Summary of Group / Topics Discussed:    Disease of addiction      Group Attendance:  Attended group session    Patient's response to the group topic/interactions:  cooperative with task    Patient appeared to be Actively participating.        Client specific details:  Mati participated and interacted appropriately with peers and staff in PM group. No triggers to use noted or discussed.

## 2021-06-16 ENCOUNTER — HOSPITAL ENCOUNTER (OUTPATIENT)
Dept: BEHAVIORAL HEALTH | Facility: CLINIC | Age: 21
End: 2021-06-16
Attending: FAMILY MEDICINE
Payer: COMMERCIAL

## 2021-06-16 VITALS — TEMPERATURE: 97.2 F | OXYGEN SATURATION: 98 %

## 2021-06-16 PROCEDURE — H2035 A/D TX PROGRAM, PER HOUR: HCPCS | Mod: HQ

## 2021-06-16 PROCEDURE — 1002N00001 HC LODGING PLUS FACILITY CHARGE ADULT

## 2021-06-16 NOTE — GROUP NOTE
Group Therapy Documentation    PATIENT'S NAME: Mati Costa  MRN:   [id number]  :   2000  ACCT. NUMBER: [id number]  DATE OF SERVICE: 21  START TIME: 12:30 PM  END TIME:  2:30 PM  FACILITATOR(S): Michael Villar LADC  TOPIC: BEH Group Therapy  Number of patients attending the group:  9  Group Length:  2 Hours    Group Therapy Type: Health and wellbeing     Summary of Group / Topics Discussed:    Sober coping skills      Group Attendance:  Attended group session    Patient's response to the group topic/interactions:  cooperative with task    Patient appeared to be Actively participating.        Client specific details:  Mati participated and interacted appropriately with peers and staff in PM group. No triggers to use noted or discussed.

## 2021-06-16 NOTE — GROUP NOTE
Group Therapy Documentation    PATIENT'S NAME: Mati Costa  MRN:   [id number]  :   2000  ACCT. NUMBER: [id number]  DATE OF SERVICE: 21  START TIME:  9:45 AM  END TIME: 11:15 AM  FACILITATOR(S): Hal Sinha LADC  TOPIC: BEH Group Therapy  Number of patients attending the group:  9  Group Length:  1.5 Hours    Group Therapy Type: Recovery strategies    Summary of Group / Topics Discussed:    Recovery Principles and Mindfulness/Relaxation      Group Attendance:  Attended group session    Patient's response to the group topic/interactions:  cooperative with task    Patient appeared to be Attentive.        Client specific details:  Mati attended AM group.Patient took part in a group walking meditation. Patient also checked in and talked about gratitude, and people who have had a pivotal role in his life.

## 2021-06-16 NOTE — GROUP NOTE
Psychoeducation Group Documentation    PATIENT'S NAME: Mati Costa  MRN:   [id number]  :   2000  ACCT. NUMBER: [id number]  DATE OF SERVICE: 21  START TIME:  8:30 AM  END TIME:  9:30 AM  FACILITATOR(S): Jaswinder Asher LADC; Christopher Paez MD  TOPIC: BEH Pyschoeducation  Number of patients attending the group:  9  Group Length:  1 Hours    Skills Group Therapy Type: Healthy behaviors development and Medication education    Summary of Group / Topics Discussed:    Medication management skills          Group Attendance:  Attended group session    Patient's response to the group topic/interactions:  cooperative with task    Patient appeared to be Attentive.         Client specific details:  Mati gave appropriate feedback..

## 2021-06-17 ENCOUNTER — HOSPITAL ENCOUNTER (OUTPATIENT)
Dept: BEHAVIORAL HEALTH | Facility: CLINIC | Age: 21
End: 2021-06-17
Attending: FAMILY MEDICINE
Payer: COMMERCIAL

## 2021-06-17 VITALS — OXYGEN SATURATION: 97 % | TEMPERATURE: 98 F

## 2021-06-17 PROCEDURE — 1002N00001 HC LODGING PLUS FACILITY CHARGE ADULT

## 2021-06-17 PROCEDURE — H2035 A/D TX PROGRAM, PER HOUR: HCPCS | Mod: HQ

## 2021-06-17 NOTE — GROUP NOTE
Group Therapy Documentation    PATIENT'S NAME: Mati Costa  MRN:   [id number]  :   2000  ACCT. NUMBER: [id number]  DATE OF SERVICE: 21  START TIME: 12:30 PM  END TIME:  2:30 PM  FACILITATOR(S): Michael Villar LADC  TOPIC: BEH Group Therapy  Number of patients attending the group: 9  Group Length:  2 Hours    Group Therapy Type: Emotion processing    Summary of Group / Topics Discussed:    Disease of addiction      Group Attendance:  Attended group session    Patient's response to the group topic/interactions:  cooperative with task    Patient appeared to be Actively participating.        Client specific details:  Mati participated and interacted appropriately with peers and staff in PM group. No triggers to use noted or discussed.

## 2021-06-17 NOTE — GROUP NOTE
Group Therapy Documentation    PATIENT'S NAME: Mati Costa  MRN:   [id number]  :   2000  ACCT. NUMBER: [id number]  DATE OF SERVICE: 21  START TIME:  3:00 PM  END TIME:  4:00 PM  FACILITATOR(S): Missy Roman; Nathaniel Bowie LADC; Sheela Mayer ADC-T  TOPIC: BEH Group Therapy  Number of patients attending the group:  9  Group Length:  1 Hours    Group Therapy Type: Health and wellbeing     Summary of Group / Topics Discussed:    Co-occurring illnesses symptom management    Group Attendance:  Attended group session    Patient's response to the group topic/interactions:  cooperative with task    Patient appeared to be Attentive and Engaged.        Client specific details:  Pt listened attentively to Dr. Carver's lecture on dual dx.

## 2021-06-17 NOTE — GROUP NOTE
Group Therapy Documentation    PATIENT'S NAME: Mati Costa  MRN:   [id number]  :   2000  ACCT. NUMBER: [id number]  DATE OF SERVICE: 21  START TIME:  9:00 AM  END TIME: 11:00 AM  FACILITATOR(S): Missy Roman  TOPIC: BEH Group Therapy  Number of patients attending the group:  9  Group Length:  2 Hours    Group Therapy Type: Recovery strategies, Emotion processing, and Daily living/independence skills    Summary of Group / Topics Discussed:    Recovery Principles, Sober coping skills, Relationship/socialization, Balanced lifestyle, Relapse prevention, and Self-care activities      Group Attendance:  Attended group session    Patient's response to the group topic/interactions:  cooperative with task    Patient appeared to be Actively participating, Attentive and Engaged.        Client specific details:  Patient was attentive and participative during group session..

## 2021-06-18 ENCOUNTER — HOSPITAL ENCOUNTER (OUTPATIENT)
Dept: BEHAVIORAL HEALTH | Facility: CLINIC | Age: 21
End: 2021-06-18
Attending: FAMILY MEDICINE
Payer: COMMERCIAL

## 2021-06-18 ENCOUNTER — OFFICE VISIT (OUTPATIENT)
Dept: ADDICTION MEDICINE | Facility: CLINIC | Age: 21
End: 2021-06-18
Payer: COMMERCIAL

## 2021-06-18 VITALS
OXYGEN SATURATION: 97 % | TEMPERATURE: 97.7 F | DIASTOLIC BLOOD PRESSURE: 60 MMHG | HEART RATE: 80 BPM | BODY MASS INDEX: 17.77 KG/M2 | SYSTOLIC BLOOD PRESSURE: 100 MMHG | WEIGHT: 131 LBS

## 2021-06-18 VITALS — TEMPERATURE: 97.5 F | OXYGEN SATURATION: 97 %

## 2021-06-18 DIAGNOSIS — F11.90 OPIOID USE DISORDER: Primary | ICD-10-CM

## 2021-06-18 DIAGNOSIS — F32.A DEPRESSION, UNSPECIFIED DEPRESSION TYPE: ICD-10-CM

## 2021-06-18 PROCEDURE — 1002N00001 HC LODGING PLUS FACILITY CHARGE ADULT

## 2021-06-18 PROCEDURE — H2035 A/D TX PROGRAM, PER HOUR: HCPCS | Mod: HQ

## 2021-06-18 PROCEDURE — 99214 OFFICE O/P EST MOD 30 MIN: CPT | Performed by: FAMILY MEDICINE

## 2021-06-18 RX ORDER — BUPRENORPHINE HYDROCHLORIDE AND NALOXONE HYDROCHLORIDE 8.6; 2.1 MG/1; MG/1
1 TABLET, ORALLY DISINTEGRATING SUBLINGUAL DAILY
Qty: 14 TABLET | Refills: 0 | Status: SHIPPED | OUTPATIENT
Start: 2021-06-18 | End: 2021-06-30

## 2021-06-18 NOTE — GROUP NOTE
Group Therapy Documentation    PATIENT'S NAME: Mati Costa  MRN:   [id number]  :   2000  ACCT. NUMBER: [id number]  DATE OF SERVICE: 21  START TIME: 12:30 PM  END TIME:  2:30 PM  FACILITATOR(S): Missy Roman  TOPIC: BEH Group Therapy  Number of patients attending the group:  9  Group Length:  2 Hours    Group Therapy Type: Recovery strategies, Emotion processing, and Daily living/independence skills    Summary of Group / Topics Discussed:    Recovery Principles, Sober coping skills, Relationship/socialization, Balanced lifestyle, and Emotions/expression      Group Attendance:  Attended group session    Patient's response to the group topic/interactions:  cooperative with task    Patient appeared to be Actively participating, Attentive and Engaged.        Client specific details:  Patient was attentive and participative during group session.

## 2021-06-18 NOTE — GROUP NOTE
Group Therapy Documentation    PATIENT'S NAME: Mati Costa  MRN:   [id number]  :   2000  ACCT. NUMBER: [id number]  DATE OF SERVICE: 21  START TIME: 12:30 PM  END TIME:  2:30 PM  FACILITATOR(S): Michael Villar LADC  TOPIC: BEH Group Therapy  Number of patients attending the group:  9  Group Length:  2 Hours    Group Therapy Type: Emotion processing    Summary of Group / Topics Discussed:    Disease of addiction      Group Attendance:  Attended group session    Patient's response to the group topic/interactions:  cooperative with task    Patient appeared to be Actively participating.        Client specific details:  Mati participated and interacted appropriately with peers and staff in AM group. No triggers to use noted or discussed.

## 2021-06-18 NOTE — PROGRESS NOTES
"  SUBJECTIVE                                                    SUBSTANCE USE DISORDER FOLLOW UP:    Mati Costa is a 21 year old male who presents to clinic today for follow up of Opioid Use Disorder (OUD).    Visit performed In Person, face-to-face      Recent HPI Details:  HPI Jun 9, 2021  - Mental health stable, no concerns, no SI  - [facility] course improving. Settling into treatment  - Remains interested in sublocade  - Zubsolv working well for cravings, well-tolerated  - Advised I will return a call to his mom regarding MH concerns, and he has no concerns with this        TODAY'S VISIT  HPI Jun 18, 2021   - Struggling with \"wanting to leave\"  - Reports a \"negative mindset\" the past few days  - Bothered by some of the folks in [facility] with him  - This pessimism is familiar from his experience using substances  - Hasn't felt willing to converse and share        Social Determinants:    Housing:  Currently in [place]   Barriers to Care: none   Support system: AA in [place], family   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 Working with a sponsor.   Upcoming Court Date(s): n/a   Consent to Communicate? Verbal consent to call mom if needed       Brief TIM History  Per intake on 4/14/21:  ROUTE OF SUBSTANCE ADMINISTRATION - Heroin - last used yesterday, injects and sometimes snorts     LONGEST PERIOD OF SOBRIETY - 8 months, last year got out of treatment               - Significant protective factors - living in a sober house was helpful      PREVIOUS DETOX/TREATMENT PROGRAMS - 2 years ago, 3 years ago      HISTORY OF OVERDOSE - Since December - 15 times. Has Narcan.     PREVIOUS MEDICATION ASSISTED TREATMENT -  previous course of suboxone 2018-9 for short period of time. Was somewhat helpful. Was difficult to take every day due to GI side effects. Usually takes through withdrawal then stops. Has bought off the street. Last took day after detox. "  Methadone taken but not prescribed.     CURRENT RECOVERY ACTIVITIES - outpatient [facility], hasn't gotten back to him yet for intake      Other Substances:     ALCOHOL- minimal, don't use alcohol   MARIJUANA- none  PRESCRIPTION STIMULANTS- none  COCAINE/CRACK- used coke one week ago - injection   METH/AMPHETAMINES-not in last year, used in the past   OPIATES- heroin - tries to use clean needles but not always   BENZODIAZEPINES- Hasn't done recently, worried it's in heroin, has had pos urine    A/P                                                    ASSESSMENT/PLAN    Diagnoses and all orders for this visit:  Opioid use disorder (H)  - zubsolv 8.6-2.1 daily  Depression, unspecified depression type      Orders Placed This Encounter   Medications     DISCONTD: Buprenorphine HCl-Naloxone HCl (ZUBSOLV) 8.6-2.1 MG SUBL     Sig: Place 1 tablet under the tongue daily     Dispense:  14 tablet     Refill:  0     NADEAN: [id number]; Please substitute for covered alternative per insurance request.       - Continue zubsolv as prescribed. Cravings no longer concerning  - Depression stable   - Wanting to visit with a counselor to do some 1:1 discussions about a difficutl relationship in his life  - Encouraged him to find different ways to interact with programming to improve chances of success and learning          RTC:  2 weeks    ENCOUNTER FOR LONG TERM USE OF HIGH RISK MEDICATION   High Risk Drug Monitoring?  YES   Drug being monitored: buprenorphine   Reason for drug: Opioid Use Disorder (OUD)   What is being monitored?: Dosage, Cravings, Trigger, side effects, and continued abstinence.    Counseled the patient on the importance of having a recovery program in addition to medication to manage recovery.  Components include avoiding isolating, having willingness to change, avoiding triggers and managing cravings. Encouraged having some type of sober network and practicing honesty with trusted support person(s). Encouraged other  services such as counseling, 12 step or other self-help organizations.      Opioid warning reviewed.  Risk of overdose following a period of abstinence due to decrease tolerance was discussed including risk of death.  Strongly recommended abstain from alcohol, benzodiazepines, THC, opioids and other drugs of abuse.  Increased risk of return to opioid use after use of these substances discussed.  Increased risk of overdose/death with use of other substances particularly benzodiazepines/alcohol reviewed.    [facility] Data Base Reviewed;   Consistent with patient reports and Epic records.          OBJECTIVE                                                    PHYSICAL EXAM:  /60   Pulse 80   Temp 97.7  F (36.5  C) (Temporal)   Wt 59.4 kg (131 lb)   SpO2 97%   BMI 17.77 kg/m      GENERAL: healthy, alert and no distress  EYES: Eyes grossly normal to inspection, PERRL and conjunctivae and sclerae normal  RESP: No respiratory distress  MENTAL STATUS EXAM  Appearance/Behavior: No appearant distress  Speech: Normal  Mood/Affect: anxiety  Insight: Fair    LAB  No results found for any visits on 06/18/21.  Reviewed labs from [facility]    HISTORY                                                    Problem list reviewed & adjusted, as indicated.  Patient Active Problem List   Diagnosis     Psychotic disorder (H)     Alcohol abuse     Opioid abuse (H)     Opioid use disorder (H)     Chemical dependency (H)         MEDICATION LIST (prior to visit)  naloxone (NARCAN) 4 MG/0.1ML nasal spray, Spray 1 spray (4 mg) into one nostril alternating nostrils as needed for opioid reversal every 2-3 minutes until assistance arrives  naloxone (NARCAN) 4 MG/0.1ML nasal spray, Spray 1 spray (4 mg) into one nostril alternating nostrils as needed for opioid reversal every 2-3 minutes until assistance arrives  traZODone (DESYREL) 100 MG tablet, Take 1 tablet (100 mg) by mouth at bedtime as needed, may repeat once for  sleep    Self Administer Medications: Behavioral Services        No Known Allergies        Christopher Paez MD  [facility]  [phone]

## 2021-06-19 ENCOUNTER — HOSPITAL ENCOUNTER (OUTPATIENT)
Dept: BEHAVIORAL HEALTH | Facility: CLINIC | Age: 21
End: 2021-06-19
Attending: FAMILY MEDICINE
Payer: COMMERCIAL

## 2021-06-19 VITALS — TEMPERATURE: 97.6 F | OXYGEN SATURATION: 97 %

## 2021-06-19 PROCEDURE — 1002N00001 HC LODGING PLUS FACILITY CHARGE ADULT

## 2021-06-19 PROCEDURE — H2035 A/D TX PROGRAM, PER HOUR: HCPCS | Mod: HQ

## 2021-06-19 NOTE — GROUP NOTE
Group Therapy Documentation    PATIENT'S NAME: Mati Costa  MRN:   [id number]  :   2000  ACCT. NUMBER: [id number]  DATE OF SERVICE: 21  START TIME:  1:00 PM  END TIME:  3:00 PM  FACILITATOR(S): Keke Nichols LADC; Hal Sinha LADC; Zeny Crum LADC  TOPIC: BEH Group Therapy  Number of patients attending the group: 31  Group Length:  2 Hours    Group Therapy Type: Recovery strategies    Summary of Group / Topics Discussed:    Sober coping skills and Relationship/socialization      Group Attendance:  Attended group session    Patient's response to the group topic/interactions:  cooperative with task    Patient appeared to be Attentive and Engaged.        Client specific details: Mati was an active participant in afternoon Relationships workshop on identifying personal strengths and skills used for recovery goals.

## 2021-06-19 NOTE — GROUP NOTE
Group Therapy Documentation    PATIENT'S NAME: Mati Costa  MRN:   [id number]  :   2000  ACCT. NUMBER: [id number]  DATE OF SERVICE: 21  START TIME:  9:00 AM  END TIME: 11:00 AM  FACILITATOR(S): Keke Nichols LADC; Zeny Crum LADC; Hal Sinha LADC  TOPIC: BEH Group Therapy  Number of patients attending the group:  31  Group Length:  2 Hours    Group Therapy Type: Recovery strategies    Summary of Group / Topics Discussed:    Recovery Principles      Group Attendance:  Attended group session    Patient's response to the group topic/interactions:  cooperative with task    Patient appeared to be Attentive.        Client specific details:  Mati attended AM workshop. They took part in a group exercise on honesty and how it is essential for recovery and healthy relationships.

## 2021-06-20 ENCOUNTER — HOSPITAL ENCOUNTER (OUTPATIENT)
Dept: BEHAVIORAL HEALTH | Facility: CLINIC | Age: 21
End: 2021-06-20
Attending: FAMILY MEDICINE
Payer: COMMERCIAL

## 2021-06-20 VITALS — TEMPERATURE: 97.9 F | OXYGEN SATURATION: 97 %

## 2021-06-20 PROCEDURE — 1002N00001 HC LODGING PLUS FACILITY CHARGE ADULT

## 2021-06-20 PROCEDURE — H2035 A/D TX PROGRAM, PER HOUR: HCPCS | Mod: HQ

## 2021-06-20 NOTE — GROUP NOTE
Group Therapy Documentation    PATIENT'S NAME: Mati Costa  MRN:   [id number]  :   2000  ACCT. NUMBER: [id number]  DATE OF SERVICE: 21  START TIME:  1:00 PM  END TIME:  2:00 PM  FACILITATOR(S): Zeny Crum LADC; Hal Sinha LADC  TOPIC: BEH Group Therapy  Number of patients attending the group:  31  Group Length:  1 Hours    Group Therapy Type: Recovery strategies    Summary of Group / Topics Discussed:    Recovery Principles and Relationship/socialization      Group Attendance:  Attended group session    Patient's response to the group topic/interactions:  cooperative with task    Patient appeared to be Actively participating and Attentive.        Client specific details:  Pt was appropriate and attentive in PM skills group. Topic included Recovery Network and The Importance of Relationship.

## 2021-06-20 NOTE — GROUP NOTE
Group Therapy Documentation    PATIENT'S NAME: Mati Costa  MRN:   [id number]  :   2000  ACCT. NUMBER: [id number]  DATE OF SERVICE: 21  START TIME:  9:00 AM  END TIME: 11:00 AM  FACILITATOR(S): [facility]; Leigh Ann Valles RN; Zeny Crum LADC  TOPIC: BEH Group Therapy  Number of patients attending the group:  31  Group Length:  2 Hours    Group Therapy Type: Recovery strategies    Summary of Group / Topics Discussed:    Recovery Principles      Group Attendance:  Attended group session    Patient's response to the group topic/interactions:  cooperative with task    Patient appeared to be Attentive.        Client specific details:  Mati attended the AM workshop led by [facility] nurse. The topics included Hep A, B, C, and TB. Patient also took part in a laughter yoga exercise and gentle movement exercise.

## 2021-06-21 ENCOUNTER — HOSPITAL ENCOUNTER (OUTPATIENT)
Dept: BEHAVIORAL HEALTH | Facility: CLINIC | Age: 21
End: 2021-06-21
Attending: FAMILY MEDICINE
Payer: COMMERCIAL

## 2021-06-21 VITALS — TEMPERATURE: 97.3 F | OXYGEN SATURATION: 97 %

## 2021-06-21 PROCEDURE — 1002N00001 HC LODGING PLUS FACILITY CHARGE ADULT

## 2021-06-21 PROCEDURE — H2035 A/D TX PROGRAM, PER HOUR: HCPCS | Mod: HQ

## 2021-06-21 NOTE — PROGRESS NOTES
Patient:  Mati Costa    Day Monday Tuesday Wednesday Thursday Friday Saturday Eliseo  Group Hours    4 hours 4 hours 5 hours 4 hours 4 hours 4 hours 2 hours  Skills Hours    0 hours 1.0 hours 0 hours 1.0 hours 0 hours 0 hours 1 hours  Individual Session (LADC)     0 hours 0 hours 0 hours 0 hours 0 hours 0 hours 0 hours  Individual Session  (psychotherapy)    0 hours 0 hours 0 hours 0 hours 0 hours 0 hours 0 hours  Peer-led Recovery Group    1.0 hours 1.0 hours 1.0 hours 1.0 hours 1.0 hours 1.0 hours 1.0 hours              Adult CD Progress Note and Treatment Plan Review     Attendance  Please refer to OP BEH CD Adult Attendance Record Documentation Flowsheet    Support group attended this week: Yes     Reporting sobriety: Yes    Treatment Plan     Treatment Plan Review competed on: 6/21/2021     Client preferred learning style:   Verbal  Hands-on  Demonstration  Reading/ written    Staff Members contributing: Michael Villar [facility]; Hal Sinha [facility]                        Received Supervision: No    Client: Pt contributed to goals and plan.    Client received copy of plan/revised plan: Yes     Client agrees with plan/revised plan: Yes        Changes to Treatment Plan: None at this time    New Goals added since last review: None at this time    Goals worked on since last review: Aftercare planning, sobriety, tx plan assignments, group therapy, build sober support network, psychoeducation. Pt reported completing his Use Hx assignment this past week.      Strategies effective: Yes     Strategies need these changes: None at this time     1) Care Coordination Activities: Pt is exploring his aftercare options at this time. Pt expressed interest in attending AA/NA meetings and psychotherapy. Pt has been referred to [facility]  for follow-up.  2) Medical, Mental Health, and other appointments the client attended: Pt reported no appointments this past week. Pt is scheduled for an individual psychotherapy session  "with + staff therapist this week.   3) Medication issues: Pt reported no concerns at this time.  4) Physical and mental health problems: Pt reported no concerns at this time.  5) Any changes in Vulnerable Adult Status? No. If yes, add to treatment plan and individual abuse prevention plan.  6) Review and evaluation of the individual abuse prevention plan: Current IAPP for this program is adequate for this client.    ASAM Risk Rating:    Dimension 1 0: Patient reports his last date of use for alcohol as 6/3/2021. Patient denies any withdrawal symptoms that would interfere with full participation in treatment programming at this time. Patient will continue to be monitored throughout treatment.    Dimension 2 0: Pt denied having medical or medication issues this past week. Pt reported no appointments this past week. Pt is scheduled for an individual psychotherapy session with + staff therapist this week.     Dimension 3 2: Pt denied having suicidal thoughts at this time. Pt reported no changes in his mood this past week. Pt reported no changes in his stress level this past week. Pt reported using \"talking it out\" as his coping technique for dealing with difficult emotions this past week. Pt reported no triggers to use this past week.    Dimension 4 2: Pt expresses internal motivation for change. Pt is active in group process, accepts and provides feedback, has good insight, and appears engaged. Pt is supportive of his group peers. Pt reported that \"money\" is what motivated him to be sober and to stay in treatment this week.    Dimension 5 4: Pt reported that his cravings were at a 6 this past week on a scale of 1 to 10, 10 being the highest/ most severe. Pt reported that \"reading\" has been his coping skills he used to manage cravings this past week.    Dimension 6 3: Pt is exploring his aftercare options at this time. Pt expressed interest in attending AA/NA meetings and psychotherapy. Pt has been referred to + " " for follow-up.    Any changes in Vulnerable Adult Status?    If yes, add to treatment plan and individual abuse prevention plan.    Family Involvement:   Pt did not have family members or concerned persons participate in his tx programming or visitation this past week. Pt was in contact with his mother this past week.    Data:   Pt offered feedback, had good insight, and patient actively participated in group. Pt shares openly during group check-in.  Pt reported no recreation activities he engaged in this past week.  Pt reported that he got along with peers and staff \"decent\" this past week.  Pt had behavioral issues over the past weekend during programming that have since been resolved.        Intervention:   Counselor feedback  Group feedback  Relapse prevention  Aftercare planning  Cognitive behavior therapy  Counselor feedback  Education  Emotional management  Motivational enhancement therapy   Twelve Step facilitation  Mental health education      Assessment:   Stages of Change Model  contemplation     Appears/ Sounds:  Cooperative  Motivated  Engaged      Plan:  Focus on recovery environment  Monitor emotional/physical health    Continue group therapy, go to AA/NA meetings when available, work with sponsor, build sober support network, engage in daily structured activities, and have sober fun.            VENECIA Borrero        "

## 2021-06-21 NOTE — GROUP NOTE
Group Therapy Documentation    PATIENT'S NAME: Mati Costa  MRN:   [id number]  :   2000  ACCT. NUMBER: [id number]  DATE OF SERVICE: 21  START TIME: 12:30 PM  END TIME:  2:30 PM  FACILITATOR(S): Hal Sinha LADC; Sweta Scott  TOPIC: BEH Group Therapy  Number of patients attending the group:  9  Group Length:  2 Hours    Group Therapy Type: Recovery strategies    Summary of Group / Topics Discussed:    Recovery Principles      Group Attendance:  Attended group session    Patient's response to the group topic/interactions:  cooperative with task    Patient appeared to be Attentive.        Client specific details:  Mati attended PM spiritual care group. Patient took part in an exercise/discussion on the importance of spirituality in one's recovery.

## 2021-06-21 NOTE — GROUP NOTE
Group Therapy Documentation    PATIENT'S NAME: Mati Costa  MRN:   [id number]  :   2000  ACCT. NUMBER: [id number]  DATE OF SERVICE: 21  START TIME: 12:30 PM  END TIME:  2:30 PM  FACILITATOR(S): Michael Villar LADC  TOPIC: BEH Group Therapy  Number of patients attending the group:  9  Group Length:  2 Hours    Group Therapy Type: Daily living/independence skills    Summary of Group / Topics Discussed:    Sober coping skills      Group Attendance:  Attended group session    Patient's response to the group topic/interactions:  cooperative with task    Patient appeared to be Actively participating.        Client specific details:  Mati participated and interacted appropriately with peers and staff in AM group. No triggers to use noted or discussed.

## 2021-06-22 ENCOUNTER — HOSPITAL ENCOUNTER (OUTPATIENT)
Dept: BEHAVIORAL HEALTH | Facility: CLINIC | Age: 21
End: 2021-06-22
Attending: FAMILY MEDICINE
Payer: COMMERCIAL

## 2021-06-22 VITALS — OXYGEN SATURATION: 99 % | TEMPERATURE: 98.1 F

## 2021-06-22 PROCEDURE — H2035 A/D TX PROGRAM, PER HOUR: HCPCS | Mod: HQ

## 2021-06-22 PROCEDURE — 1002N00001 HC LODGING PLUS FACILITY CHARGE ADULT

## 2021-06-22 NOTE — ADDENDUM NOTE
Encounter addended by: Hal Sinha [facility] on: 6/22/2021 8:26 AM   Actions taken: Clinical Note Signed

## 2021-06-22 NOTE — ADDENDUM NOTE
Encounter addended by: Hal Sinha [facility] on: 6/22/2021 8:28 AM   Actions taken: Clinical Note Signed

## 2021-06-22 NOTE — GROUP NOTE
Psychoeducation Group Documentation    PATIENT'S NAME: Mati Costa  MRN:   [id number]  :   2000  ACCT. NUMBER: [id number]  DATE OF SERVICE: 21  START TIME:  3:00 PM  END TIME:  4:00 PM  FACILITATOR(S): Missy Roman; Sheela Mayer LADC; Nathaniel Bowie LADC  TOPIC: BEH Pyschoeducation  Number of patients attending the group:  33  Group Length:  1 Hours    Skills Group Therapy Type: Daily living/independence skills    Summary of Group / Topics Discussed:    Relationship/social skills and Balanced lifestyle skills          Group Attendance:  Attended group session    Patient's response to the group topic/interactions:  cooperative with task    Patient appeared to be Attentive and Engaged.         Client specific details:  .

## 2021-06-22 NOTE — PROGRESS NOTES
"Patient has been reminded several times over the weekend and today about wearing his baseball cap despite several times being asked to remove it from staff (LP rule). Patient also left the workshop early on Sat 6/19 and was found in the [facility] lounge watching tv. When asked why he left early he replied \"I wasn't feeling it\". Multiple patients also complained to this writer about patients \"lack of maturity\" and questioning his ability to continue with treatment programming. Patient will continue to be observed and talk to about his behaviors.   "

## 2021-06-22 NOTE — GROUP NOTE
Group Therapy Documentation    PATIENT'S NAME: Mati Costa  MRN:   [id number]  :   2000  ACCT. NUMBER: [id number]  DATE OF SERVICE: 21  START TIME:  9:00 AM  END TIME: 11:00 AM  FACILITATOR(S): Michael Villar LADC  TOPIC: BEH Group Therapy  Number of patients attending the group:  9  Group Length:  2 Hours    Group Therapy Type: Recovery strategies    Summary of Group / Topics Discussed:    Disease of addiction      Group Attendance:  Attended group session    Patient's response to the group topic/interactions:  cooperative with task    Patient appeared to be Actively participating.        Client specific details:  Mati participated and interacted appropriately with peers and staff in AM group. No triggers to use noted or discussed.

## 2021-06-22 NOTE — PROGRESS NOTES
"Patient met with program  to review and initiate aftercare placement opportunities. Patient presents with minimal interest in exploring aftercare options. Patient states that \"I already have a plan in place\".  Patient indicates that he is not interested in any further treatment options after completing LP. He reports that he has made arrangements to move in with his mother for a month and then with his brother the 1st of August. Patient states that he has a job as a  at [employer] and will begin back at work the day after he leaves here. Patient reports that he has a strong sober support network. He notes positive ongoing relationships with people he has met through the [place] along with multiple  sober house organizations ie. [facility]. Patient states that he has a sponsor and is connected with a number of sober support groups throughout the [facility].  Patient was encouraged to connect with writer should he wish to explore alternative aftercare options.    "

## 2021-06-22 NOTE — GROUP NOTE
Group Therapy Documentation    PATIENT'S NAME: Mati Costa  MRN:   [id number]  :   2000  ACCT. NUMBER: [id number]  DATE OF SERVICE: 21  START TIME: 12:30 PM  END TIME:  2:30 PM  FACILITATOR(S): Hal Sinha LADC  TOPIC: BEH Group Therapy  Number of patients attending the group:  9  Group Length:  2 Hours    Group Therapy Type: Recovery strategies    Summary of Group / Topics Discussed:    Recovery Principles      Group Attendance:  Attended group session    Patient's response to the group topic/interactions:  cooperative with task    Patient appeared to be Attentive.        Client specific details:  Mati  attended PM group. Patient took part in a discussion on anxiety/stress management. He also gave feedback to multiple peers who presented assignments. Patient also took part in a guided walking meditation.

## 2021-06-22 NOTE — PROGRESS NOTES
"This writer, and group co-counselor, talked with patient about ongoing problematic behaviors, ie: leaving groups early, coloring in group, sleeping, not wearing his mask, and continuing to wear a baseball cap. Patient was defensive and argumentative\"what about__?\" Counselors answered patient queries and patient continued to want to argue. Patient was told that if problematic behaviors continue that he will be discharged early from [facility]. Patient continues to want to argue and was asked to leave counselor's office. Patient understands that if he continues problem behavior that it will result in discharge.   Aftercare counselor plans on meeting with patient this afternoon to look at sober living/TIM treatment that patient could discharge to in the event that problematic behaviors continue.   "

## 2021-06-23 ENCOUNTER — HOSPITAL ENCOUNTER (OUTPATIENT)
Dept: BEHAVIORAL HEALTH | Facility: CLINIC | Age: 21
End: 2021-06-23
Attending: FAMILY MEDICINE
Payer: COMMERCIAL

## 2021-06-23 VITALS — OXYGEN SATURATION: 100 % | TEMPERATURE: 98.1 F

## 2021-06-23 PROCEDURE — H2035 A/D TX PROGRAM, PER HOUR: HCPCS | Mod: HQ

## 2021-06-23 PROCEDURE — H2035 A/D TX PROGRAM, PER HOUR: HCPCS

## 2021-06-23 PROCEDURE — 1002N00001 HC LODGING PLUS FACILITY CHARGE ADULT

## 2021-06-23 NOTE — GROUP NOTE
Group Therapy Documentation    PATIENT'S NAME: Mati Costa  MRN:   [id number]  :   2000  ACCT. NUMBER: [id number]  DATE OF SERVICE: 21  START TIME:  8:30 AM  END TIME:  9:30 AM  FACILITATOR(S): Michael Villar LADC  TOPIC: BEH Group Therapy  Number of patients attending the group:  9  Group Length:  1 Hours    Group Therapy Type: Recovery strategies    Summary of Group / Topics Discussed:    Disease of addiction      Group Attendance:  Attended group session    Patient's response to the group topic/interactions:  cooperative with task    Patient appeared to be Actively participating.        Client specific details:  Mati participated and interacted appropriately with peers and staff in AM group. No triggers to use noted or discussed.

## 2021-06-23 NOTE — GROUP NOTE
Group Therapy Documentation    PATIENT'S NAME: Mati Costa  MRN:   [id number]  :   2000  ACCT. NUMBER: [id number]  DATE OF SERVICE: 21  START TIME: 12:30 PM  END TIME:  2:30 PM  FACILITATOR(S): Michael Villar LADC  TOPIC: BEH Group Therapy  Number of patients attending the group:  9  Group Length:  2 Hours    Group Therapy Type: Emotion processing    Summary of Group / Topics Discussed:    Disease of addiction      Group Attendance:  Attended group session    Patient's response to the group topic/interactions:  cooperative with task    Patient appeared to be Actively participating.        Client specific details:  Mati participated and interacted appropriately with peers and staff in PM group. No triggers to use noted or discussed.

## 2021-06-23 NOTE — GROUP NOTE
Group Therapy Documentation    PATIENT'S NAME: Mati Costa  MRN:   [id number]  :   2000  ACCT. NUMBER: [id number]  DATE OF SERVICE: 21  START TIME:  9:45 AM  END TIME: 11:15 AM  FACILITATOR(S): Hal Sinha LADC  TOPIC: BEH Group Therapy  Number of patients attending the group:  8  Group Length:  1.5 Hours    Group Therapy Type: Recovery strategies    Summary of Group / Topics Discussed:    Recovery Principles      Group Attendance:  Attended group session    Patient's response to the group topic/interactions:  cooperative with task    Patient appeared to be Attentive.        Client specific details:  Mati attended AM group. Patient talked about the importance of caryl/spirituality in his life. Patient also checked in including what they are liking/not liking about being sober. Patient also gave a peer feedback on their presented assignment.

## 2021-06-24 ENCOUNTER — TELEPHONE (OUTPATIENT)
Dept: BEHAVIORAL HEALTH | Facility: CLINIC | Age: 21
End: 2021-06-24

## 2021-06-24 NOTE — TELEPHONE ENCOUNTER
Thanks for letting me know. That's a bummer. I'll make sure to give him a call    Christopher Paez MD

## 2021-06-24 NOTE — TELEPHONE ENCOUNTER
Hi Dr Paez,    this pt is being discharged from  for dishonest behavior.  There have been several other behavioral incidents, but this one got him discharged.  Staff was looking for him everywhere this morning and then he came late to group.  When he sat down in group, his cell phone fell out of his pocket. Pt's know they are not allowed to have there cell phones.    His next apt with you is on 6/30    GUSTAVO Beltrán

## 2021-06-25 NOTE — ADDENDUM NOTE
Encounter addended by: Michael Villar LADC on: 6/25/2021 11:08 AM   Actions taken: Clinical Note Signed

## 2021-06-25 NOTE — PROGRESS NOTES
CHEMICAL DEPENDENCY DISCHARGE SUMMARY    PATIENT NAME:  Mati Costa  :  2000    EVALUATION COUNSELOR: VENECIA Hernandez  TREATMENT COUNSELORS: VENECIA Borrero; VENECIA uGerra  REFERRAL SOURCE:  [facility], primary care provider, and family  PROGRAM:  [facility]  ADMISSION DATE: 2021   DATE OF LAST SESSION: 2021   DISCHARGE DATE: 2021    ADMISSION DIAGNOSIS:    Opioid Use Disorder, Severe (F11.20)  Tobacco Use Disorder, Moderate (F17.200)    DISCHARGE DIAGNOSIS:  Opioid Use Disorder, Severe (F11.20)  Tobacco Use Disorder, Moderate (F17.200)    DISCHARGE STATUS: Pt was discharged from the [facility] due to behavioral issues.   LAST USE DATE: Patient reported last date of use as 6/3/2021  DAYS OF TREATMENT COMPLETED:  Patient completed 19 days of treatment    PRESENTING INFORMATION:  Mr. Mati Costa presented to the [facility] in [place], [place] for a substance use evaluation and treatment. The pt was referred by [facility], primary care provider, and family. The pt was seeking treatment for Opioid use disorder. The pt reported a history of prior treatment episodes, interventions, psychiatric hold, previous diagnoses which include(s): an anxiety disorder; a bipolar disorder. Pt reported that he is homeless and unemployed. However, he reported that he has stable housing and employment options after discharge from [facility]. Pt was assessed to be appropriate for substance use disorder treatment at [facility].        READMITTANCE INFORMATION: If additional substance use treatment is required, pt may re-admit into the [facility] program following 30 days from date of discharge.     SERVICES PROVIDED:  Our services included assessment, treatment planning and education regarding chemical dependency, mental illness, relationships, and relapse prevention.  The patient was also scheduled to participated in  "individual therapy, although, he did not attend his first session due to behavioral discharge, group therapy, recovery oriented workshops, spiritual care counseling, recovery skills training and aftercare planning.    ISSUES ADDRESS IN TREATMENT:    DIMENSION 1 - ACUTE INTOXICATION/WITHDRAWAL POTENTIAL  ADMISSION RISK RATIN  DISCHARGE RISK RATIN  Patient entered into [facility] on 2021. Patient reported substances of use as heroin IV. Patient reported last date of use as 6/3/2021. Throughout treatment patient denied any withdrawal symptoms that would interfere with full participation in treatment programming.     DIMENSION 2 - BIOMEDICAL COMPLICATIONS AND CONDITIONS  ADMISSION RISK RATIN  DISCHARGE RISK RATIN  Upon admissions patient denied any medical concerns that would interfere with full participation in treatment programming. Patient denied having a PCP, however, he will still be seeing Dr. Stinson at [facility] post discharge from [facility]. Patient maintain medication compliance throughout treatment. Upon discharge patient appeared able to access medical aid as needed.     DIMENSION 3 - EMOTIONAL, BEHAVIORAL, COGNITIVE CONDITIONS AND COMPLICATIONS  ADMISSION RISK RATIN  DISCHARGE RISK RATING: 3  Upon admissions patient reported a mental health diagnosis of: a history of depression and anxiety. To address this concern patient was scheduled to meet with staff therapist RAVI Paiz. Upon admissions patient was given a suicidal risk screening. Patient was rated as \"Very Low Risk\". Upon discharge patient denied any suicidal thoughts or ideations.     DIMENSION 4 - READINESS FOR CHANGE  ADMISSION RISK RATIN  DISCHARGE RISK RATIN  Goal: Develop a greater awareness of substance use patterns and identify consequences of addiction, strength and commitment to making significant lifestyle changes supportive of recovery. Intervention: Pt completed by christian" not present his drug use history/ progression assignment due to his discharge. The assignment helps to identify patterns of use and related emotional costs. The pt minimally presented as an active and willing participant in all scheduled programming. Pt's behavior was often distracting to others and himself during much of the stay at [facility]. Pt was confronted on behavior multiple times during his stay.      DIMENSION 5 - RELAPSE, CONTINUED USE AND CONTINUED PROBLEM POTENTIAL   ADMISSION RISK RATIN  DISCHARGE RISK RATIN  Goal: Structure an aftercare plan, which will provide for continued support and skills development. Intervention: The pt was encouraged by his primary counselors to consider transitioning into an outpatient tx program after completing [facility]. The pt declined any aftercare options prior to being discharged from [facility]. Goal: Gain insight into personal relapse cues and effective prevention strategies. Intervention: Pt attended one relapse prevention workshop. The pt was provided the assignment to develop a personal relapse prevention plan which he did not complete. Pt was also required to attend at least three sober support meetings a week while in [facility].    DIMENSION 6 - RECOVERY ENVIRONMENT  ADMISSION RISK RATING: 3  DISCHARGE RISK RATING: 3  Goal: Ensure that living environment is supportive of recovery. Intervention: Pt reported that he would be obtaining an apartment with his brother shortly after discharge from [facility] and was going to be staying with his mother at her house until he moves into his new apartment. Goal: Begin to develop skills necessary to building a sober support network, identify helpful community resources. Intervention: The pt reported that he had established contact with his previous sponsor. Resources were made available to assist pt in identifying sober support group times and locations or via Zoom he  might attend after completing  [facility]. Goal: Begin process of healing personal relationships damaged by ongoing use and related behaviors. Intervention: Pt was encouraged to invite family/ concerned persons to attend an individual session via telephone conference call. Pt's mother did participate in the session. The pt attended one weekend workshops on personal relationships.          STRENGTHS:  While pt displayed distracting and rule breaking behavior pt maintained a mostly positive attitude while in treatment. Patient was an active participant in group therapy and was always open for feedback from his counselors and peers. However, pt did display passive engagement in programming at times during his treatment. Patient appears motivated for recovery at this time and willing to incorporate positive changes into his life.     LIVING ARRANGEMENTS AT DISCHARGE: Pt reported that he would be obtaining an apartment with his brother shortly after discharge from [facility] and was going to be staying with his mother at her house until he moves into his new apartment.     PROGNOSIS:  Prognosis for this patient is unfavorable at this time. This can be upgraded if the patient follows the continuing care recommendations below.      CONTINUING CARE RECOMMENDATIONS AND REFERRALS:    1.  Abstain from all mood-altering chemicals unless prescribed by a licensed medical provider, and take all medications as prescribed.  2.  Attend a minimum of three AA/NA/Sober support groups weekly in the community.  3.  Maintain regular contact with your sponsor.   4.  Admit immediately into another residential treatment program and follow all recommendations.  5.  Continue to invest in building a sober support network.  6.  Continue to monitor and understand relapse triggers and stressors through the use and development of healthy coping skills.  7.  Obtain a mental health therapist and attend all scheduled programming  8. Attend all scheduled medical appointments.  9.  Take medication as prescribed       This information has been disclosed to you from records protected by Federal confidentiality rules (42 CFR part 2). The Federal rules prohibit you from making any further disclosure of this information unless further disclosure is expressly permitted by the written consent of the person to whom it pertains or as otherwise permitted by 42 CFR part 2. A general authorization for the release of medical or other information is NOT sufficient for this purpose. The Federal rules restrict any use of the information to criminally investigate or prosecute any alcohol or drug abuse patient.       VENECIA Borrero

## 2021-06-28 ENCOUNTER — TELEPHONE (OUTPATIENT)
Dept: ADDICTION MEDICINE | Facility: CLINIC | Age: 21
End: 2021-06-28

## 2021-06-28 NOTE — TELEPHONE ENCOUNTER
Called Mati to follow-up after he left [facility] last week. He was discharged last week after he had a phone during group, against the rules of the program.    He reports doing well. He is restarting his job today. He is unsure about his future with [employer]. Will send [employer] message with scheduling information in case he wants to schedule.    Follow-up visit on Wednesday. No concerns noted.    Christopher Paez MD

## 2021-06-30 ENCOUNTER — OFFICE VISIT (OUTPATIENT)
Dept: ADDICTION MEDICINE | Facility: CLINIC | Age: 21
End: 2021-06-30
Payer: COMMERCIAL

## 2021-06-30 VITALS
WEIGHT: 129.2 LBS | SYSTOLIC BLOOD PRESSURE: 110 MMHG | BODY MASS INDEX: 17.52 KG/M2 | TEMPERATURE: 97.6 F | HEART RATE: 79 BPM | DIASTOLIC BLOOD PRESSURE: 70 MMHG | OXYGEN SATURATION: 97 %

## 2021-06-30 DIAGNOSIS — F32.A DEPRESSION, UNSPECIFIED DEPRESSION TYPE: ICD-10-CM

## 2021-06-30 DIAGNOSIS — F11.90 OPIOID USE DISORDER: ICD-10-CM

## 2021-06-30 DIAGNOSIS — F11.90 OPIOID USE DISORDER: Primary | ICD-10-CM

## 2021-06-30 LAB
AMPHETAMINES UR QL: NOT DETECTED NG/ML
BARBITURATES UR QL SCN: NOT DETECTED NG/ML
BENZODIAZ UR QL SCN: NOT DETECTED NG/ML
BUPRENORPHINE UR QL: ABNORMAL NG/ML
CANNABINOIDS UR QL: NOT DETECTED NG/ML
COCAINE UR QL SCN: NOT DETECTED NG/ML
D-METHAMPHET UR QL: NOT DETECTED NG/ML
METHADONE UR QL SCN: NOT DETECTED NG/ML
OPIATES UR QL SCN: ABNORMAL NG/ML
OXYCODONE UR QL SCN: NOT DETECTED NG/ML
PCP UR QL SCN: NOT DETECTED NG/ML
PROPOXYPH UR QL: NOT DETECTED NG/ML
TRICYCLICS UR QL SCN: NOT DETECTED NG/ML

## 2021-06-30 PROCEDURE — 80306 DRUG TEST PRSMV INSTRMNT: CPT | Performed by: FAMILY MEDICINE

## 2021-06-30 PROCEDURE — 80361 OPIATES 1 OR MORE: CPT | Mod: 90 | Performed by: FAMILY MEDICINE

## 2021-06-30 PROCEDURE — 99214 OFFICE O/P EST MOD 30 MIN: CPT | Performed by: FAMILY MEDICINE

## 2021-06-30 PROCEDURE — 99000 SPECIMEN HANDLING OFFICE-LAB: CPT | Performed by: FAMILY MEDICINE

## 2021-06-30 RX ORDER — BUPRENORPHINE HYDROCHLORIDE AND NALOXONE HYDROCHLORIDE 8.6; 2.1 MG/1; MG/1
1 TABLET, ORALLY DISINTEGRATING SUBLINGUAL DAILY
Qty: 14 TABLET | Refills: 0 | Status: SHIPPED | OUTPATIENT
Start: 2021-06-30

## 2021-06-30 NOTE — PROGRESS NOTES
"  SUBJECTIVE                                                    SUBSTANCE USE DISORDER FOLLOW UP:    Mati Costa is a 21 year old male who presents to clinic today for follow up of Opioid Use Disorder (OUD).    Visit performed In Person, face-to-face      Recent HPI Details:  HPI Jun 9, 2021  - Mental health stable, no concerns, no SI  - [facility] course improving. Settling into treatment  - Remains interested in sublocade  - Zubsolv working well for cravings, well-tolerated  - Advised I will return a call to his mom regarding MH concerns, and he has no concerns with this  HPI Jun 18, 2021   - Struggling with \"wanting to leave\"  - Reports a \"negative mindset\" the past few days  - Bothered by some of the folks in [place] with him  - This pessimism is familiar from his experience using substances  - Hasn't felt willing to converse and share      TODAY'S VISIT  HPI Jun 30, 2021  - Moved home with his mom after he left [facility]; left early d/t having phone during meeting and was discharged  - His mom UA'ed him at home and he was positive for opioids, so she kicked him out  - They were able to reconcile and he moved back in  - He denies any substance use; vaping occasionally, trying to quit but not putting effort into this right now  - Continues to have swelling in his left eye; no fevers, no warmth, no redness or worsening. He took an incomplete course of antibiotics May 7.        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 Working with a sponsor.   Upcoming Court Date(s): n/a   Consent to Communicate? Verbal consent to call mom if needed       Brief TIM History  Per intake on 4/14/21:  ROUTE OF SUBSTANCE ADMINISTRATION - Heroin - last used yesterday, injects and sometimes snorts     LONGEST PERIOD OF " SOBRIETY - 8 months, last year got out of treatment               - Significant protective factors - living in a sober house was helpful      PREVIOUS DETOX/TREATMENT PROGRAMS - 2 years ago, 3 years ago      HISTORY OF OVERDOSE - Since December - 15 times. Has Narcan.     PREVIOUS MEDICATION ASSISTED TREATMENT -  previous course of suboxone 2018-9 for short period of time. Was somewhat helpful. Was difficult to take every day due to GI side effects. Usually takes through withdrawal then stops. Has bought off the street. Last took day after detox.  Methadone taken but not prescribed.     CURRENT RECOVERY ACTIVITIES - outpatient [facility], hasn't gotten back to him yet for intake      Other Substances:     ALCOHOL- minimal, don't use alcohol   MARIJUANA- none  PRESCRIPTION STIMULANTS- none  COCAINE/CRACK- used coke one week ago - injection   METH/AMPHETAMINES-not in last year, used in the past   OPIATES- heroin - tries to use clean needles but not always   BENZODIAZEPINES- Hasn't done recently, worried it's in heroin, has had pos urine    A/P                                                    ASSESSMENT/PLAN    Diagnoses and all orders for this visit:  Opioid use disorder (H)  -     Urine Drugs of Abuse Screen Panel 13; Future  -     Buprenorphine HCl-Naloxone HCl (ZUBSOLV) 8.6-2.1 MG SUBL; Place 1 tablet under the tongue daily  -     Opiates quantitative urine; Future  Depression, unspecified depression type      Orders Placed This Encounter   Medications     Buprenorphine HCl-Naloxone HCl (ZUBSOLV) 8.6-2.1 MG SUBL     Sig: Place 1 tablet under the tongue daily     Dispense:  14 tablet     Refill:  0     NADEAN: [id number]; Please substitute for covered alternative per insurance request.       - Still considering sublocade but unsure if he wants to do this; today does not wish to pursue  - Declines therapy today  - MH stable, no concerns  - Continue zubsolv as above  - Meeting every 2 weeks for now until he settles into  a recovery routine at his [facility] with his brother  - opiate confirmation ordered  - reassured him his eye looks ok and he can use cold compresses and NSAIDs to improve swelling. He will follow-up with PCP for re-evaluation if needed    Patient has narcan?  Yes  Reported IVDU in past 2 months?  Yes  Infectious disease screening UTD?  Yes   - Needs retesting end of July      RTC:  2 weeks    ENCOUNTER FOR LONG TERM USE OF HIGH RISK MEDICATION   High Risk Drug Monitoring?  YES   Drug being monitored: buprenorphine   Reason for drug: Opioid Use Disorder (OUD)   What is being monitored?: Dosage, Cravings, Trigger, side effects, and continued abstinence.    Counseled the patient on the importance of having a recovery program in addition to medication to manage recovery.  Components include avoiding isolating, having willingness to change, avoiding triggers and managing cravings. Encouraged having some type of sober network and practicing honesty with trusted support person(s). Encouraged other services such as counseling, 12 step or other self-help organizations.      Opioid warning reviewed.  Risk of overdose following a period of abstinence due to decrease tolerance was discussed including risk of death.  Strongly recommended abstain from alcohol, benzodiazepines, THC, opioids and other drugs of abuse.  Increased risk of return to opioid use after use of these substances discussed.  Increased risk of overdose/death with use of other substances particularly benzodiazepines/alcohol reviewed.    [facility] Board of Pharmacy Data Base Reviewed;   Consistent with patient reports and Epic records.          OBJECTIVE                                                    PHYSICAL EXAM:  /70   Pulse 79   Temp 97.6  F (36.4  C) (Temporal)   Wt 58.6 kg (129 lb 3.2 oz)   SpO2 97%   BMI 17.52 kg/m      GENERAL: healthy, alert and no distress  EYES: Eyes grossly normal to inspection, PERRL and conjunctivae and  sclerae normal. Left upper eye socket mildly swollen inferior to eyebrow, eyelid appears normal. Palpable mild swelling but no consolidation, no redness or warmth, no tenderness on the soft tissue or orbital bones  RESP: No respiratory distress  MENTAL STATUS EXAM  Appearance/Behavior: No appearant distress  Speech: Normal  Mood/Affect: normal affect  Insight: Adequate    LAB  Results for orders placed or performed in visit on 06/30/21   Urine Drugs of Abuse Screen Panel 13     Status: Abnormal   Result Value Ref Range    Cannabinoids (60-ttz-8-carboxy-9-THC) Not Detected NDET^Not Detected ng/mL    Phencyclidine (Phencyclidine) Not Detected NDET^Not Detected ng/mL    Cocaine (Benzoylecgonine) Not Detected NDET^Not Detected ng/mL    Methamphetamine (d-Methamphetamine) Not Detected NDET^Not Detected ng/mL    Opiates (Morphine) Detected, Abnormal Result (A) NDET^Not Detected ng/mL    Amphetamine (d-Amphetamine) Not Detected NDET^Not Detected ng/mL    Benzodiazepines (Nordiazepam) Not Detected NDET^Not Detected ng/mL    Tricyclic Antidepressants (Desipramine) Not Detected NDET^Not Detected ng/mL    Methadone (Methadone) Not Detected NDET^Not Detected ng/mL    Barbiturates (Butalbital) Not Detected NDET^Not Detected ng/mL    Oxycodone (Oxycodone) Not Detected NDET^Not Detected ng/mL    Propoxyphene (Norpropoxyphene) Not Detected NDET^Not Detected ng/mL    Buprenorphine (Buprenorphine) Detected, Abnormal Result (A) NDET^Not Detected ng/mL         HISTORY                                                    Problem list reviewed & adjusted, as indicated.  Patient Active Problem List   Diagnosis     Psychotic disorder (H)     Alcohol abuse     Opioid abuse (H)     Opioid use disorder (H)     Chemical dependency (H)         MEDICATION LIST (prior to visit)  naloxone (NARCAN) 4 MG/0.1ML nasal spray, Spray 1 spray (4 mg) into one nostril alternating nostrils as needed for opioid reversal every 2-3 minutes until assistance  arrives  naloxone (NARCAN) 4 MG/0.1ML nasal spray, Spray 1 spray (4 mg) into one nostril alternating nostrils as needed for opioid reversal every 2-3 minutes until assistance arrives  traZODone (DESYREL) 100 MG tablet, Take 1 tablet (100 mg) by mouth at bedtime as needed, may repeat once for sleep    Self Administer Medications: Behavioral Services        No Known Allergies        Christopher Paez MD  [facility]  [phone]

## 2021-07-06 LAB
6MAM SERPL-MCNC: NEGATIVE NG/ML
CODEINE UR CFM-MCNC: NEGATIVE NG/ML
MORPHINE UR CFM-MCNC: 441 NG/ML

## 2021-07-14 ENCOUNTER — VIRTUAL VISIT (OUTPATIENT)
Dept: ADDICTION MEDICINE | Facility: CLINIC | Age: 21
End: 2021-07-14
Payer: COMMERCIAL

## 2021-07-14 DIAGNOSIS — Z53.9 ERRONEOUS ENCOUNTER--DISREGARD: Primary | ICD-10-CM

## 2021-07-14 PROCEDURE — 99207 PR NO SHOW FOR SCHEDULED APPT: CPT | Performed by: FAMILY MEDICINE

## 2021-07-14 NOTE — PROGRESS NOTES
Mati is a 21 year old who is being evaluated via a billable video visit.      How would you like to obtain your AVS? [employer]  If the video visit is dropped, the invitation should be resent by: Text to cell phone: [phone]  Will anyone else be joining your video visit? No

## 2021-07-16 NOTE — PROGRESS NOTES
Attempted to reach patient several times in the afternoon.    Appt scheduled at 3:40, patient checked in with [facility], but provider was unable to join appt until 4:05pm. At that time, the patient was not present and further attempts to reach him were unsuccessful.    Attempted reaching him again several times throughout the week without success.    Appt will be marked as cancelled.    Christopher Paez MD    Appointment cancelled.

## 2021-10-09 ENCOUNTER — HEALTH MAINTENANCE LETTER (OUTPATIENT)
Age: 21
End: 2021-10-09

## 2021-12-14 ENCOUNTER — TELEPHONE (OUTPATIENT)
Dept: BEHAVIORAL HEALTH | Facility: CLINIC | Age: 21
End: 2021-12-14
Payer: COMMERCIAL

## 2021-12-14 NOTE — TELEPHONE ENCOUNTER
KIRSTIN,   Lora carlson ([phone]) from [facility] - offering case management   Also trying to reach pt   Hasn't seen you since June 2021  Brianna GONZALEZ RN

## 2022-05-21 ENCOUNTER — HEALTH MAINTENANCE LETTER (OUTPATIENT)
Age: 22
End: 2022-05-21

## 2022-09-17 ENCOUNTER — HEALTH MAINTENANCE LETTER (OUTPATIENT)
Age: 22
End: 2022-09-17

## 2023-06-03 ENCOUNTER — HEALTH MAINTENANCE LETTER (OUTPATIENT)
Age: 23
End: 2023-06-03

## 2024-05-16 NOTE — PROGRESS NOTES
"Case Management Note  4/5/2021    Met with pt to discuss discharge planning. Pt reports he is set up to start [facility] program 4/6/21. Pt declines any [facility]. Pt reports he lives with \"my girl,\" and reports this is a safe, sober, supportive environment. Pt reports he was in a sober house last year and has some sober friends who he can reach out to for more support. Declines any resources from case management at this time. AVS updated.     Elly Doll, [facility], LADC          " Not controlled.   Try zoloft 50mg.   Start with 25mg x 1 week then up to 50mg.   Rtc in 4 weeks or sooner if problems arise.     Red flags and indication for immediate medical attention discussed with the patient who is receptive, expressed understanding and is agreeable to plan. All questions answered.    Continue care with suboxone clinic.   Discussed need to wean off xanax in future. Pt is aware of need.   Cont med at current dose for now with plans to wean in future.

## 2024-07-13 ENCOUNTER — HEALTH MAINTENANCE LETTER (OUTPATIENT)
Age: 24
End: 2024-07-13